# Patient Record
Sex: FEMALE | Race: WHITE | Employment: UNEMPLOYED | URBAN - METROPOLITAN AREA
[De-identification: names, ages, dates, MRNs, and addresses within clinical notes are randomized per-mention and may not be internally consistent; named-entity substitution may affect disease eponyms.]

---

## 2017-02-06 ENCOUNTER — HOSPITAL ENCOUNTER (OUTPATIENT)
Dept: GENERAL RADIOLOGY | Age: 52
Discharge: HOME | End: 2017-02-06
Payer: COMMERCIAL

## 2017-02-06 DIAGNOSIS — M47.812 CERVICAL SPONDYLOSIS: ICD-10-CM

## 2017-02-06 PROCEDURE — 72040 X-RAY EXAM NECK SPINE 2-3 VW: CPT

## 2017-02-21 ENCOUNTER — HOSPITAL ENCOUNTER (OUTPATIENT)
Dept: GENERAL RADIOLOGY | Age: 52
Discharge: HOME | End: 2017-02-21
Payer: COMMERCIAL

## 2017-02-21 DIAGNOSIS — M25.551 RIGHT HIP PAIN: ICD-10-CM

## 2017-02-21 DIAGNOSIS — M54.16 RIGHT LUMBAR RADICULOPATHY: ICD-10-CM

## 2017-02-21 PROCEDURE — 72100 X-RAY EXAM L-S SPINE 2/3 VWS: CPT

## 2017-02-21 PROCEDURE — 73502 X-RAY EXAM HIP UNI 2-3 VIEWS: CPT

## 2017-04-04 ENCOUNTER — HOSPITAL ENCOUNTER (OUTPATIENT)
Dept: LAB | Age: 52
Discharge: HOME | End: 2017-04-04

## 2017-04-04 PROCEDURE — 88305 TISSUE EXAM BY PATHOLOGIST: CPT | Performed by: INTERNAL MEDICINE

## 2017-04-05 ENCOUNTER — HOSPITAL ENCOUNTER (OUTPATIENT)
Dept: GENERAL RADIOLOGY | Age: 52
Discharge: HOME | End: 2017-04-05
Payer: COMMERCIAL

## 2017-04-05 DIAGNOSIS — M47.812 CERVICAL SPONDYLOSIS: ICD-10-CM

## 2017-04-05 PROCEDURE — 72040 X-RAY EXAM NECK SPINE 2-3 VW: CPT

## 2017-04-12 ENCOUNTER — APPOINTMENT (OUTPATIENT)
Dept: MAMMOGRAPHY | Age: 52
End: 2017-04-12
Attending: FAMILY MEDICINE
Payer: COMMERCIAL

## 2017-04-12 ENCOUNTER — HOSPITAL ENCOUNTER (OUTPATIENT)
Dept: MAMMOGRAPHY | Age: 52
Discharge: HOME | End: 2017-04-12
Attending: FAMILY MEDICINE
Payer: COMMERCIAL

## 2017-04-12 DIAGNOSIS — Z12.39 SCREENING FOR BREAST CANCER: ICD-10-CM

## 2017-04-12 PROCEDURE — 77067 SCR MAMMO BI INCL CAD: CPT

## 2018-05-21 PROBLEM — F32.1 MODERATE SINGLE CURRENT EPISODE OF MAJOR DEPRESSIVE DISORDER (HCC): Status: ACTIVE | Noted: 2018-05-21

## 2019-01-14 PROBLEM — E66.01 OBESITY, MORBID (HCC): Status: ACTIVE | Noted: 2019-01-14

## 2020-10-28 PROBLEM — M25.551 RIGHT HIP PAIN: Status: ACTIVE | Noted: 2020-10-28

## 2020-11-04 ENCOUNTER — HOSPITAL ENCOUNTER (OUTPATIENT)
Dept: MAMMOGRAPHY | Age: 55
Discharge: HOME | End: 2020-11-04
Attending: FAMILY MEDICINE
Payer: COMMERCIAL

## 2020-11-04 DIAGNOSIS — Z12.31 ENCOUNTER FOR SCREENING MAMMOGRAM FOR MALIGNANT NEOPLASM OF BREAST: ICD-10-CM

## 2020-11-04 PROCEDURE — 77067 SCR MAMMO BI INCL CAD: CPT

## 2022-03-19 PROBLEM — F32.1 MODERATE SINGLE CURRENT EPISODE OF MAJOR DEPRESSIVE DISORDER (HCC): Status: ACTIVE | Noted: 2018-05-21

## 2022-03-19 PROBLEM — M25.551 RIGHT HIP PAIN: Status: ACTIVE | Noted: 2020-10-28

## 2022-03-19 PROBLEM — E66.01 OBESITY, MORBID (HCC): Status: ACTIVE | Noted: 2019-01-14

## 2022-04-29 PROBLEM — M25.561 RIGHT KNEE PAIN: Status: ACTIVE | Noted: 2022-04-29

## 2022-05-13 PROBLEM — E11.9 CONTROLLED TYPE 2 DIABETES MELLITUS WITHOUT COMPLICATION, WITHOUT LONG-TERM CURRENT USE OF INSULIN (HCC): Status: ACTIVE | Noted: 2022-05-13

## 2022-05-25 ENCOUNTER — TELEPHONE (OUTPATIENT)
Dept: FAMILY MEDICINE CLINIC | Facility: CLINIC | Age: 57
End: 2022-05-25

## 2022-05-25 NOTE — TELEPHONE ENCOUNTER
Patient is requesting a call back from nurse   regarding paperwork for disability. Patient would like a call back as soon   as possible.

## 2022-05-25 NOTE — TELEPHONE ENCOUNTER
----- Message from Mahalia Kanner sent at 5/23/2022  5:09 PM EDT -----  Subject: Message to Provider    QUESTIONS  Information for Provider? Patient is requesting a call back from nurse   regarding paperwork for disability. Patient would like a call back as soon   as possible.  ---------------------------------------------------------------------------  --------------  CALL BACK INFO  What is the best way for the office to contact you? OK to leave message on   voicemail  Preferred Call Back Phone Number? [phone]  ---------------------------------------------------------------------------  --------------  SCRIPT ANSWERS  Relationship to Patient?  Self

## 2022-05-27 ENCOUNTER — HOSPITAL ENCOUNTER (OUTPATIENT)
Dept: MAMMOGRAPHY | Age: 57
Discharge: HOME | End: 2022-05-30

## 2022-05-27 ENCOUNTER — HOSPITAL ENCOUNTER (OUTPATIENT)
Dept: MAMMOGRAPHY | Age: 57
Discharge: HOME | End: 2022-05-30
Payer: COMMERCIAL

## 2022-05-27 DIAGNOSIS — Z12.31 ENCOUNTER FOR SCREENING MAMMOGRAM FOR BREAST CANCER: ICD-10-CM

## 2022-05-27 PROCEDURE — 77067 SCR MAMMO BI INCL CAD: CPT

## 2022-06-09 DIAGNOSIS — M25.542 JOINT PAIN IN FINGERS OF LEFT HAND: Primary | ICD-10-CM

## 2022-06-09 DIAGNOSIS — Z12.31 ENCOUNTER FOR SCREENING MAMMOGRAM FOR MALIGNANT NEOPLASM OF BREAST: Primary | ICD-10-CM

## 2022-06-17 ENCOUNTER — TELEPHONE (OUTPATIENT)
Dept: FAMILY MEDICINE CLINIC | Facility: CLINIC | Age: 57
End: 2022-06-17

## 2022-06-17 NOTE — TELEPHONE ENCOUNTER
Patient called,  New Fax # for [phone] [facility] ( they still have not received them)    Fax [phone]  In the space for insured ID [id number]   Needs to be on the page when faxed.

## 2022-07-12 ENCOUNTER — HOSPITAL ENCOUNTER (OUTPATIENT)
Dept: NUTRITION | Age: 57
Discharge: HOME | End: 2022-07-15

## 2022-07-12 PROCEDURE — 97802 MEDICAL NUTRITION INDIV IN: CPT

## 2022-07-12 NOTE — PROGRESS NOTES
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 Stage of Change: Action    Anthropometrics:   Estimated body mass index is 38.11 kg/m² as calculated from the following:    Height as of 5/13/22: 5' 9.5\" (1.765 m). Weight as of 5/13/22: 261 lb 12.8 oz (118.8 kg). Estimated Nutrition Needs:  MSJ x1.2= 2200 for weight reduction: 1800kcal/day     Protein: 90-100g/day (20%)     DIAGNOSIS:  Unbalanced diet related to nutrition knowledge deficit as evidenced by eating pattern as above. INTERVENTION:  Goal: Improved food variety  Goal: Achieve glycemic control    Goal for Diabetes MNT: Decrease complications from diabetes and CVD risk by intensive lifestyle modification specifically geared towards healthier food choices, and increased movement to support 5-10% weight reduction. Use MyPlate guide to compose structured meals (1/2 plate non starchy vegetables, 1/4 plate lean protein, 1/4 plate starchy vegetable or whole grain)  Consume 3-5 structured meals/snacks daily, using MyPlate to compose a balanced plate, and snack list provided. Track meals for 7 days. Check and record fasting, and 2hr PPBG during this time. Aim to reduce daily intake of added sugars to <24 grams for women/ 36 grams for men. Aim to reduce daily intake of sodium to < 2300mg/day (~600mg per meal, <140mg/ snack). Nutrition Education and Counseling (60 minutes):  Reviewed MNT Guidelines for DM  1. Discussed effects of foods/beverages on metabolic parameters (blood pressure, glucose, lipids, blood pressure and weight). Encouraged structured eating: planning ahead for macronutrient balanced meals and snacks, consistent CHO intake according to prescribed meal pattern, not skipping meals, and designated times for meals/snacks. 2. Encouraged reducing overall carbohydrate intake from baseline, choosing low glycemic, fiber rich foods and complex carbohydrate sources, lean protein sources, and heart healthy fats, and limiting treat foods to 2x a week or less. 3. Reviewed food sources of CHO, lean protein, and heart healthy fats. 4. Assessed and Individualized nutrient recommendations. Increase dietary fiber to 7-10 grams per meal, increase, increase water intake to 64 ounces daily, consume 2 servings of fruit daily        Materials Provided and Discussed:  Nutrition goals, BG targets, and signs/syptoms of hypoglycemia. Utilized the following behavior change strategies: MI, goal setting   Patient verbalized understanding of recommendations discussed. Goal: Glycemic improvements from baseline, increase fiber in diet, improve quality of current diet from baseline. Action Steps:           1. Track and record meals and 2hr PPBG          2. Try true lemon/lime flavoring packets          3. Consume 3 structured meals daily. - use my plate as a guide. MONITORING & EVALUATION:  Monitor Food and Nutrient Intake and Biochemical  Follow Up: PRN pt is a self pay, she agrees to schedule a follow up visit if PPBG is out of range.

## 2022-08-15 ENCOUNTER — OFFICE VISIT (OUTPATIENT)
Dept: FAMILY MEDICINE CLINIC | Facility: CLINIC | Age: 57
End: 2022-08-15
Payer: COMMERCIAL

## 2022-08-15 VITALS
DIASTOLIC BLOOD PRESSURE: 70 MMHG | OXYGEN SATURATION: 96 % | TEMPERATURE: 97.2 F | RESPIRATION RATE: 16 BRPM | BODY MASS INDEX: 34.93 KG/M2 | SYSTOLIC BLOOD PRESSURE: 126 MMHG | HEIGHT: 70 IN | HEART RATE: 72 BPM | WEIGHT: 244 LBS

## 2022-08-15 DIAGNOSIS — L30.9 DERMATITIS: ICD-10-CM

## 2022-08-15 DIAGNOSIS — E11.9 CONTROLLED TYPE 2 DIABETES MELLITUS WITHOUT COMPLICATION, WITHOUT LONG-TERM CURRENT USE OF INSULIN (HCC): Primary | ICD-10-CM

## 2022-08-15 LAB
ALBUMIN SERPL-MCNC: 4 G/DL (ref 3.5–5)
ALBUMIN/GLOB SERPL: 1 {RATIO} (ref 1.2–3.5)
ALP SERPL-CCNC: 90 U/L (ref 50–136)
ALT SERPL-CCNC: 41 U/L (ref 12–65)
ANION GAP SERPL CALC-SCNC: 7 MMOL/L (ref 7–16)
AST SERPL-CCNC: 29 U/L (ref 15–37)
BILIRUB SERPL-MCNC: 0.5 MG/DL (ref 0.2–1.1)
BUN SERPL-MCNC: 16 MG/DL (ref 6–23)
CALCIUM SERPL-MCNC: 9.8 MG/DL (ref 8.3–10.4)
CHLORIDE SERPL-SCNC: 107 MMOL/L (ref 98–107)
CHOLEST SERPL-MCNC: 237 MG/DL
CO2 SERPL-SCNC: 25 MMOL/L (ref 21–32)
CREAT SERPL-MCNC: 0.8 MG/DL (ref 0.6–1)
EST. AVERAGE GLUCOSE BLD GHB EST-MCNC: 140 MG/DL
GLOBULIN SER CALC-MCNC: 3.9 G/DL (ref 2.3–3.5)
GLUCOSE SERPL-MCNC: 105 MG/DL (ref 65–100)
HBA1C MFR BLD: 6.5 % (ref 4.8–5.6)
HDLC SERPL-MCNC: 41 MG/DL (ref 40–60)
HDLC SERPL: 5.8 {RATIO}
LDLC SERPL CALC-MCNC: 152.2 MG/DL
POTASSIUM SERPL-SCNC: 4.4 MMOL/L (ref 3.5–5.1)
PROT SERPL-MCNC: 7.9 G/DL (ref 6.3–8.2)
SODIUM SERPL-SCNC: 139 MMOL/L (ref 136–145)
TRIGL SERPL-MCNC: 219 MG/DL (ref 35–150)
VLDLC SERPL CALC-MCNC: 43.8 MG/DL (ref 6–23)

## 2022-08-15 PROCEDURE — 99214 OFFICE O/P EST MOD 30 MIN: CPT | Performed by: FAMILY MEDICINE

## 2022-08-15 PROCEDURE — 3046F HEMOGLOBIN A1C LEVEL >9.0%: CPT | Performed by: FAMILY MEDICINE

## 2022-08-15 RX ORDER — TRAZODONE HYDROCHLORIDE 50 MG/1
TABLET ORAL
COMMUNITY
Start: 2022-05-27

## 2022-08-15 RX ORDER — TRIAMCINOLONE ACETONIDE 0.25 MG/G
CREAM TOPICAL
Qty: 40 G | Refills: 5 | Status: SHIPPED | OUTPATIENT
Start: 2022-08-15

## 2022-08-15 NOTE — PROGRESS NOTES
[facility] [facility]  Phone: [phone] Fax [phone]  Vinh Barajas MD  8/15/2022         Ms. Bernabe Segovia  is a 62y.o.  year old  female patient who comes in to check on diabetes, hypertension, and high cholesterol. Checks blood sugars daily and they have been running 80s to 120s. She has lost weight and feels better. Last eye exam was last year. Feet have no problems. Did  have a flu shot this year. Did have a pneumonia shot . Did have a tetanus shot 2017. Has not been working on diet and exercise. Blood pressure has been under control. She has had pretty bad diarrhea from the metformin and is taking just a half a tablet a day. She is wondering if she could stop it since she has lost weight. She has developed an itchy rash between both eyebrows on her left eyelid and on either side of her nose. She has not used any new soaps shampoos or detergents. Some moisturizers seem to make it worse. Ms. Bernabe Segovia  has  has a past medical history of Arthritis, Cervical spondylosis with radiculopathy, Chondromalacia of left patellofemoral joint, Chondromalacia of patella, Chronic pain, MMT (medial meniscus tear), Obesity (BMI 01-58.5), Plica syndrome, Shoulder pain, right, Tear of lateral cartilage or meniscus of knee, current, Tear of lateral meniscus of left knee, current, Tear of medial meniscus of left knee, current, Varicose veins, and Venous insufficiency (chronic) (peripheral). Ms. Bernabe Segovia  has  has a past surgical history that includes implant breast silicone/eq (Bilateral, 1995); Cervical discectomy (1999); orthopedic surgery (Right); Knee arthroscopy (Right, 2015); Knee arthroscopy (Left, 2016); Tonsillectomy (as child); Hysterectomy (1998); Breast surgery; and heent.     Ms. Bernabe Segovia   Current Outpatient Medications   Medication Sig Dispense Refill    metFORMIN (GLUCOPHAGE) 500 MG tablet Take 500 mg by mouth daily (with breakfast) traZODone (DESYREL) 50 MG tablet TAKE 1 TABLET BY MOUTH NIGHTLY      triamcinolone (KENALOG) 0.025 % cream Apply topically 2 times daily. 40 g 5    acetaminophen (TYLENOL) 500 MG tablet Take by mouth every 6 hours as needed      FLUoxetine (PROZAC) 10 MG capsule Take 10 mg by mouth daily      gabapentin (NEURONTIN) 300 MG capsule 3 at bedtime      lidocaine-prilocaine (EMLA) 2.5-2.5 % cream Apply topically as needed       No current facility-administered medications for this visit. Ms. Stan Ortiz   Family History   Problem Relation Age of Onset    No Known Problems Brother     Diabetes Mother     Osteoarthritis Mother     No Known Problems Sister     Cancer Father         skin       Ms. Stan Ortiz    Social History     Socioeconomic History    Marital status:      Spouse name: Not on file    Number of children: Not on file    Years of education: Not on file    Highest education level: Not on file   Occupational History    Not on file   Tobacco Use    Smoking status: Former     Packs/day: 1.75     Types: Cigarettes     Quit date: 1998     Years since quittin.5    Smokeless tobacco: Never   Substance and Sexual Activity    Alcohol use: Yes     Alcohol/week: 0.0 standard drinks    Drug use: No    Sexual activity: Not on file   Other Topics Concern    Not on file   Social History Narrative    Not on file     Social Determinants of Health     Financial Resource Strain: Not on file   Food Insecurity: Not on file   Transportation Needs: Not on file   Physical Activity: Not on file   Stress: Not on file   Social Connections: Not on file   Intimate Partner Violence: Not on file   Housing Stability: Not on file       Ms. Stan Ortiz  has the following allergies:    Allergies   Allergen Reactions    Hydrocodone-Acetaminophen Itching and Rash       /70   Pulse 72   Temp 97.2 °F (36.2 °C)   Resp 16   Ht 5' 9.5\" (1.765 m)   Wt 244 lb (110.7 kg)   SpO2 96%   BMI 35.52 kg/m²     HEENT: Normocephalic, No atraumatic, pupils equal and reactive to light. Neck: Supple, no masses or thyromegaly. Lungs: clear to auscultation bilaterally. CV: regular rate and rhythm, without murmurs, rubs, or gallops. Skin: Red rash between her eyebrows and beside her nose and on her left eyelid. Ext: No lower extremity edema,    Diabetic foot exam:   Left Foot:   Visual Exam: normal   Pulse DP: 2+ (normal)   Filament test: normal sensation   Vibratory Sensation: normal  Right Foot:   Visual Exam: normal   Pulse DP: 2+ (normal)   Filament test: normal sensation   Vibratory Sensation: normal    No results found for this visit on 08/15/22. Chrissy Lloyd was seen today for diabetes. Diagnoses and all orders for this visit:    Controlled type 2 diabetes mellitus without complication, without long-term current use of insulin (HCC)  -     Hemoglobin A1C; Future  -     Comprehensive Metabolic Panel; Future  -     Lipid Panel; Future    Dermatitis  -     triamcinolone (KENALOG) 0.025 % cream; Apply topically 2 times daily.  -     [facility] - Manjit Blancas MD, PA      Patient counseled on diet and exercise. Commended her on her weight loss. She may indeed be able to get off metformin. Labs today. Follow-up 6 months. Refer to dermatology.   Leanne Sosa MD

## 2022-08-16 ENCOUNTER — TELEPHONE (OUTPATIENT)
Dept: NUTRITION | Age: 57
End: 2022-08-16

## 2022-08-16 ENCOUNTER — TELEPHONE (OUTPATIENT)
Dept: FAMILY MEDICINE CLINIC | Facility: CLINIC | Age: 57
End: 2022-08-16

## 2022-08-16 NOTE — TELEPHONE ENCOUNTER
Nutrition Counseling: Contacted pt regarding referral. See notes documented in Nutrition Counseling Referral for details. No further follow-up contact from pt. Will close referral for this office.     [facility]  [phone]

## 2022-08-16 NOTE — RESULT ENCOUNTER NOTE
Let patient know labs normal as far as A1c. She got it down to 6.5 which is excellent. She did get her cholesterol down to 237 but her LDL is still high at 152. Would like to recheck her cholesterol at her follow-up in 6 months and she may need a cholesterol medicine then.

## 2022-08-16 NOTE — TELEPHONE ENCOUNTER
----- Message from Vanessa Flores MD sent at 8/16/2022  6:54 AM EDT -----  Let patient know labs normal as far as A1c. She got it down to 6.5 which is excellent. She did get her cholesterol down to 237 but her LDL is still high at 152. Would like to recheck her cholesterol at her follow-up in 6 months and she may need a cholesterol medicine then.

## 2023-01-11 ENCOUNTER — OFFICE VISIT (OUTPATIENT)
Dept: FAMILY MEDICINE CLINIC | Facility: CLINIC | Age: 58
End: 2023-01-11
Payer: COMMERCIAL

## 2023-01-11 ENCOUNTER — NURSE ONLY (OUTPATIENT)
Dept: FAMILY MEDICINE CLINIC | Facility: CLINIC | Age: 58
End: 2023-01-11

## 2023-01-11 VITALS
OXYGEN SATURATION: 97 % | HEART RATE: 80 BPM | RESPIRATION RATE: 16 BRPM | WEIGHT: 242 LBS | DIASTOLIC BLOOD PRESSURE: 79 MMHG | HEIGHT: 69 IN | TEMPERATURE: 97.6 F | BODY MASS INDEX: 35.84 KG/M2 | SYSTOLIC BLOOD PRESSURE: 123 MMHG

## 2023-01-11 DIAGNOSIS — E11.9 CONTROLLED TYPE 2 DIABETES MELLITUS WITHOUT COMPLICATION, WITHOUT LONG-TERM CURRENT USE OF INSULIN (HCC): Primary | ICD-10-CM

## 2023-01-11 DIAGNOSIS — M25.551 RIGHT HIP PAIN: ICD-10-CM

## 2023-01-11 DIAGNOSIS — E11.9 CONTROLLED TYPE 2 DIABETES MELLITUS WITHOUT COMPLICATION, WITHOUT LONG-TERM CURRENT USE OF INSULIN (HCC): ICD-10-CM

## 2023-01-11 DIAGNOSIS — M79.601 RIGHT ARM PAIN: ICD-10-CM

## 2023-01-11 DIAGNOSIS — F32.1 MODERATE SINGLE CURRENT EPISODE OF MAJOR DEPRESSIVE DISORDER (HCC): ICD-10-CM

## 2023-01-11 DIAGNOSIS — G89.29 CHRONIC PAIN OF RIGHT KNEE: ICD-10-CM

## 2023-01-11 DIAGNOSIS — M25.561 CHRONIC PAIN OF RIGHT KNEE: ICD-10-CM

## 2023-01-11 DIAGNOSIS — H61.21 IMPACTED CERUMEN OF RIGHT EAR: ICD-10-CM

## 2023-01-11 DIAGNOSIS — M79.604 PAIN OF RIGHT LOWER EXTREMITY: ICD-10-CM

## 2023-01-11 PROCEDURE — 99214 OFFICE O/P EST MOD 30 MIN: CPT | Performed by: FAMILY MEDICINE

## 2023-01-11 RX ORDER — GABAPENTIN 300 MG/1
CAPSULE ORAL
Qty: 90 CAPSULE | Refills: 11 | Status: SHIPPED | OUTPATIENT
Start: 2023-01-11 | End: 2024-01-11

## 2023-01-11 RX ORDER — GABAPENTIN 300 MG/1
300 CAPSULE ORAL 3 TIMES DAILY
Qty: 270 CAPSULE | Refills: 3 | Status: CANCELLED | OUTPATIENT
Start: 2023-01-11

## 2023-01-11 RX ORDER — FLUOXETINE 10 MG/1
10 TABLET, FILM COATED ORAL DAILY
Qty: 30 TABLET | Refills: 11 | Status: SHIPPED | OUTPATIENT
Start: 2023-01-11

## 2023-01-11 RX ORDER — FLUOXETINE 10 MG/1
10 CAPSULE ORAL DAILY
Qty: 90 CAPSULE | Refills: 3 | Status: CANCELLED | OUTPATIENT
Start: 2023-01-11

## 2023-01-11 ASSESSMENT — PATIENT HEALTH QUESTIONNAIRE - PHQ9
8. MOVING OR SPEAKING SO SLOWLY THAT OTHER PEOPLE COULD HAVE NOTICED. OR THE OPPOSITE, BEING SO FIGETY OR RESTLESS THAT YOU HAVE BEEN MOVING AROUND A LOT MORE THAN USUAL: 3
SUM OF ALL RESPONSES TO PHQ QUESTIONS 1-9: 6
4. FEELING TIRED OR HAVING LITTLE ENERGY: 1
1. LITTLE INTEREST OR PLEASURE IN DOING THINGS: 0
SUM OF ALL RESPONSES TO PHQ QUESTIONS 1-9: 6
5. POOR APPETITE OR OVEREATING: 1
SUM OF ALL RESPONSES TO PHQ9 QUESTIONS 1 & 2: 0
SUM OF ALL RESPONSES TO PHQ QUESTIONS 1-9: 6
SUM OF ALL RESPONSES TO PHQ QUESTIONS 1-9: 6
10. IF YOU CHECKED OFF ANY PROBLEMS, HOW DIFFICULT HAVE THESE PROBLEMS MADE IT FOR YOU TO DO YOUR WORK, TAKE CARE OF THINGS AT HOME, OR GET ALONG WITH OTHER PEOPLE: 3
6. FEELING BAD ABOUT YOURSELF - OR THAT YOU ARE A FAILURE OR HAVE LET YOURSELF OR YOUR FAMILY DOWN: 0
7. TROUBLE CONCENTRATING ON THINGS, SUCH AS READING THE NEWSPAPER OR WATCHING TELEVISION: 0
2. FEELING DOWN, DEPRESSED OR HOPELESS: 0
3. TROUBLE FALLING OR STAYING ASLEEP: 1
9. THOUGHTS THAT YOU WOULD BE BETTER OFF DEAD, OR OF HURTING YOURSELF: 0

## 2023-01-11 NOTE — PROGRESS NOTES
[address] [facility] J[address] Kenny  Phone: [phone] Fax [phone]  Yuval Berry MD  1/11/2023         Ms. Dwight Garcia  is a 62y.o.  year old  female patient who comes in to check on diabetes, hypertension, and high cholesterol. Checks blood sugars daily and they have been running 80s to 120s. She has lost weight and feels better. Last eye exam was last year. Feet have no problems. Did  have a flu shot this year. Did have a pneumonia shot . Did have a tetanus shot 2017. Has not been working on diet and exercise. Blood pressure has been under control. She has had pretty bad diarrhea from the metformin and is taking just a half a tablet a day. She is wondering if she could stop it since she has lost weight. She has had some left ear pain that is better, but she just wanted it checked. She has noticed some left jaw pain as well and it goes down into the left side of her neck but her throat does not really sore. She does take gabapentin at night for pain in her legs and knees. She does have some decrease in her feet and has had a neuropathy. She is doing well with the Prozac 10 mg for depression. She is wondering if she can stop it now. She has been on it about 4 years. No thoughts of suicide.     PHQ-9  1/11/2023   Little interest or pleasure in doing things 0   Little interest or pleasure in doing things -   Feeling down, depressed, or hopeless 0   Trouble falling or staying asleep, or sleeping too much 1   Trouble falling or staying asleep, or sleeping too much -   Feeling tired or having little energy 1   Feeling tired or having little energy -   Poor appetite or overeating 1   Poor appetite, weight loss, or overeating -   Feeling bad about yourself - or that you are a failure or have let yourself or your family down 0   Feeling bad about yourself - or that you are a failure or have let yourself or your family down -   Trouble concentrating on things, such as reading the newspaper or watching television 0   Trouble concentrating on things such as school, work, reading, or watching TV -   Moving or speaking so slowly that other people could have noticed. Or the opposite - being so fidgety or restless that you have been moving around a lot more than usual 3   Moving or speaking so slowly that other people could have noticed; or the opposite being so fidgety that others notice -   Thoughts that you would be better off dead, or of hurting yourself in some way 0   Thoughts of being better off dead, or hurting yourself in some way -   PHQ-2 Score 0   Total Score PHQ 2 -   PHQ-9 Total Score 6   PHQ 9 Score -   If you checked off any problems, how difficult have these problems made it for you to do your work, take care of things at home, or get along with other people? 3   How difficult have these problems made it for you to do your work, take care of your home and get along with others -        Ms. Aguila Perez  has  has a past medical history of Arthritis, Cervical spondylosis with radiculopathy, Chondromalacia of left patellofemoral joint, Chondromalacia of patella, Chronic pain, MMT (medial meniscus tear), Obesity (BMI 38-16.6), Plica syndrome, Shoulder pain, right, Tear of lateral cartilage or meniscus of knee, current, Tear of lateral meniscus of left knee, current, Tear of medial meniscus of left knee, current, Varicose veins, and Venous insufficiency (chronic) (peripheral). Ms. Aguila Perez  has  has a past surgical history that includes implant breast silicone/eq (Bilateral, 1995); Cervical discectomy (1999); orthopedic surgery (Right); Knee arthroscopy (Right, 2015); Knee arthroscopy (Left, 2016); Tonsillectomy (as child); Hysterectomy (1998); Breast surgery; and heent.     Ms. Aguila Perez   Current Outpatient Medications   Medication Sig Dispense Refill    gabapentin (NEURONTIN) 300 MG capsule 3 at bedtime 90 capsule 11    FLUoxetine (PROZAC) 10 MG tablet Take 1 tablet by mouth daily 30 tablet 11    acetaminophen (TYLENOL) 500 MG tablet Take by mouth every 6 hours as needed      FLUoxetine (PROZAC) 10 MG capsule Take 10 mg by mouth daily      lidocaine-prilocaine (EMLA) 2.5-2.5 % cream Apply topically as needed       No current facility-administered medications for this visit. Ms. Anitha Lazaro   Family History   Problem Relation Age of Onset    No Known Problems Brother     Diabetes Mother     Osteoarthritis Mother     No Known Problems Sister     Cancer Father         skin       Ms. Anitha Lazaro    Social History     Socioeconomic History    Marital status:      Spouse name: Not on file    Number of children: Not on file    Years of education: Not on file    Highest education level: Not on file   Occupational History    Not on file   Tobacco Use    Smoking status: Former     Packs/day: 1.75     Types: Cigarettes     Quit date: 1998     Years since quittin.9    Smokeless tobacco: Never   Substance and Sexual Activity    Alcohol use: Yes     Alcohol/week: 0.0 standard drinks    Drug use: No    Sexual activity: Not on file   Other Topics Concern    Not on file   Social History Narrative    Not on file     Social Determinants of Health     Financial Resource Strain: Not on file   Food Insecurity: Not on file   Transportation Needs: Not on file   Physical Activity: Not on file   Stress: Not on file   Social Connections: Not on file   Intimate Partner Violence: Not on file   Housing Stability: Not on file       Ms. Anitha Lazaro  has the following allergies: Allergies   Allergen Reactions    Hydrocodone-Acetaminophen Itching and Rash       /79   Pulse 80   Temp 97.6 °F (36.4 °C)   Resp 16   Ht 5' 9.2\" (1.758 m)   Wt 242 lb (109.8 kg)   SpO2 97%   BMI 35.53 kg/m²     HEENT: Normocephalic, atraumatic, pupils equal and reactive to light. Tympanic membrane on the right is obscured by wax and could not be washed out. Left Tms intact without erythema or edema. Oropharynx clear. She does have some pain over her left jaw joint with opening and closing her mouth. Neck: Supple, no masses or thyromegaly. Lungs: clear to auscultation bilaterally. CV: regular rate and rhythm, without murmurs, rubs, or gallops. Ext: No lower extremity edema,    Diabetic foot exam:   Left Foot:   Visual Exam: normal   Pulse DP: 2+ (normal)   Filament test: reduced sensation   Vibratory Sensation: normal  Right Foot:   Visual Exam: normal   Pulse DP: 2+ (normal)   Filament test: reduced sensation   Vibratory Sensation: normal    No results found for this visit on 01/11/23. Coty Quick was seen today for diabetes, otalgia and pharyngitis. Diagnoses and all orders for this visit:    Controlled type 2 diabetes mellitus without complication, without long-term current use of insulin (HCC)  -     Hemoglobin A1C; Future    Moderate single current episode of major depressive disorder (HCC)  -     FLUoxetine (PROZAC) 10 MG tablet; Take 1 tablet by mouth daily    Right hip pain  -     gabapentin (NEURONTIN) 300 MG capsule; 3 at bedtime    Right arm pain  -     gabapentin (NEURONTIN) 300 MG capsule; 3 at bedtime    Pain of right lower extremity  -     gabapentin (NEURONTIN) 300 MG capsule; 3 at bedtime    Chronic pain of right knee  -     gabapentin (NEURONTIN) 300 MG capsule; 3 at bedtime    Impacted cerumen of right ear        She can taper off the Prozac by taking 1 every other day for a week and then stop. I did give her a prescription for it if she feels like she needs to go back on it. Check an A1c today. Follow-up 6 months. I did not see evidence of any infection needs treating but she will use Tylenol arthritis for jaw pain. Patient counseled on diet and exercise.     Selina Porter MD

## 2023-01-12 LAB
EST. AVERAGE GLUCOSE BLD GHB EST-MCNC: 131 MG/DL
HBA1C MFR BLD: 6.2 % (ref 4.8–5.6)

## 2023-04-11 ENCOUNTER — PATIENT MESSAGE (OUTPATIENT)
Dept: FAMILY MEDICINE CLINIC | Facility: CLINIC | Age: 58
End: 2023-04-11

## 2023-05-16 ENCOUNTER — TELEPHONE (OUTPATIENT)
Dept: FAMILY MEDICINE CLINIC | Facility: CLINIC | Age: 58
End: 2023-05-16

## 2023-05-16 NOTE — TELEPHONE ENCOUNTER
Pt called last week for copy of a form that   indicated her condition and limitations. Was directed to Medical Records. When she spoke with them, they informed her that she would have to get a   copy of that document from her PCP office. The document includes her   diagnosis and limitations as well as DME needed. Please call pt to advise   how she can obtain this information. Time period includes 2020 - 2023. This is needed for her  to represent her disability claim.

## 2023-05-16 NOTE — TELEPHONE ENCOUNTER
Pt called last week for copy of a form that   indicated her condition and limitations. Was directed to Medical Records. When she spoke with them, they informed her that she would have to get a   copy of that document from her PCP office. The document includes her   diagnosis and limitations as well as DME needed. Please call pt to advise   how she can obtain this information. Time period includes 2020 - 2023. This is needed for her  to represent her disability claim. Will call pt to come fill out MAITE. Pt will come by to sign form. Left at  for pt to sign.

## 2023-05-23 RX ORDER — LIDOCAINE AND PRILOCAINE 25; 25 MG/G; MG/G
CREAM TOPICAL
Qty: 30 G | Refills: 0 | OUTPATIENT
Start: 2023-05-23

## 2023-08-02 ENCOUNTER — OFFICE VISIT (OUTPATIENT)
Dept: FAMILY MEDICINE CLINIC | Facility: CLINIC | Age: 58
End: 2023-08-02
Payer: COMMERCIAL

## 2023-08-02 VITALS
SYSTOLIC BLOOD PRESSURE: 146 MMHG | TEMPERATURE: 98 F | DIASTOLIC BLOOD PRESSURE: 79 MMHG | BODY MASS INDEX: 37.95 KG/M2 | RESPIRATION RATE: 18 BRPM | WEIGHT: 256.2 LBS | OXYGEN SATURATION: 98 % | HEIGHT: 69 IN | HEART RATE: 66 BPM

## 2023-08-02 DIAGNOSIS — R03.0 ELEVATED BLOOD PRESSURE READING WITHOUT DIAGNOSIS OF HYPERTENSION: ICD-10-CM

## 2023-08-02 DIAGNOSIS — M54.50 RIGHT-SIDED LOW BACK PAIN WITHOUT SCIATICA, UNSPECIFIED CHRONICITY: Primary | ICD-10-CM

## 2023-08-02 PROCEDURE — 99213 OFFICE O/P EST LOW 20 MIN: CPT | Performed by: PHYSICIAN ASSISTANT

## 2023-08-02 RX ORDER — NABUMETONE 500 MG/1
500 TABLET, FILM COATED ORAL 2 TIMES DAILY
Qty: 60 TABLET | Refills: 0 | Status: SHIPPED | OUTPATIENT
Start: 2023-08-02

## 2023-08-02 ASSESSMENT — ENCOUNTER SYMPTOMS
BACK PAIN: 1
GASTROINTESTINAL NEGATIVE: 1
SHORTNESS OF BREATH: 0

## 2023-08-02 NOTE — PROGRESS NOTES
[facility], [address] [place] [address]  Phone [phone]      Patient: Shayna Penaloza  YOB: 1965  Age 62 y.o. Sex female  Medical Record:  [id number]  Visit Date: 08/02/23  Author:  Robson Min PA-C    Family Practice Clinic Note    Chief Complaint   Patient presents with    Back Pain     Middle of back, goes into right hip- hurt back in shower on 8/1       History of Present Illness  This is a 51-year-old female who presents today with complaints of acute onset of right-sided low back pain. She has had a history of neck and back pain occurring on and off in the past.  She has had 2 surgeries on the C-spine but denies surgical intervention at the T or L-spine. She states that yesterday while in the shower, sitting in her shower chair, she bent over to pick something up off of the shower floor and felt a pull on the right side of her back. She has had pain from the upper aspect of the lower back radiating to the right buttock. Denies any radiation down the leg however. Denies numbness, tingling or weakness of the extremity. She states that almost any movement exacerbates the pain. She has been using Tylenol and takes gabapentin at bedtime for her previous issues. These have not provided much relief for her current pain. She denies difficulty controlling bowel or bladder. Denies increased urinary frequency, dysuria or gross hematuria. Past History:    Past Medical history   Past Medical History:   Diagnosis Date    Arthritis     OA- generalized     Cervical spondylosis with radiculopathy 12/13/2016    Neck surgery - Dr. Venkata Mack.     Chondromalacia of left patellofemoral joint 3/11/2016    Chondromalacia of patella 6/18/2015    Chronic pain     neck and R arm since 2013    MMT (medial meniscus tear)     right    Obesity (BMI 30-39.9) 04/70/6666    BMI 38    Plica syndrome 7/64/0748    Shoulder pain, right     and right hip pain    Tear of lateral cartilage

## 2023-08-02 NOTE — PATIENT INSTRUCTIONS
*Try the antiinflammatory (Relafen) - take twice a day - take with food. *Recommend alternating ice (for 10 min) and heat (for 30 min), 3-4 times a day as able/needed.

## 2023-09-12 ENCOUNTER — OFFICE VISIT (OUTPATIENT)
Dept: FAMILY MEDICINE CLINIC | Facility: CLINIC | Age: 58
End: 2023-09-12
Payer: COMMERCIAL

## 2023-09-12 VITALS
WEIGHT: 260 LBS | RESPIRATION RATE: 16 BRPM | SYSTOLIC BLOOD PRESSURE: 129 MMHG | TEMPERATURE: 97.3 F | BODY MASS INDEX: 38.51 KG/M2 | OXYGEN SATURATION: 97 % | HEIGHT: 69 IN | HEART RATE: 76 BPM | DIASTOLIC BLOOD PRESSURE: 87 MMHG

## 2023-09-12 DIAGNOSIS — G89.29 CHRONIC PAIN OF RIGHT KNEE: ICD-10-CM

## 2023-09-12 DIAGNOSIS — F41.9 ANXIETY: Primary | ICD-10-CM

## 2023-09-12 DIAGNOSIS — M79.601 RIGHT ARM PAIN: ICD-10-CM

## 2023-09-12 DIAGNOSIS — M25.551 RIGHT HIP PAIN: ICD-10-CM

## 2023-09-12 DIAGNOSIS — M79.641 RIGHT HAND PAIN: ICD-10-CM

## 2023-09-12 DIAGNOSIS — M25.561 CHRONIC PAIN OF RIGHT KNEE: ICD-10-CM

## 2023-09-12 DIAGNOSIS — F32.1 MODERATE SINGLE CURRENT EPISODE OF MAJOR DEPRESSIVE DISORDER (HCC): ICD-10-CM

## 2023-09-12 DIAGNOSIS — M54.2 NECK PAIN: ICD-10-CM

## 2023-09-12 DIAGNOSIS — M47.22 CERVICAL SPONDYLOSIS WITH RADICULOPATHY: ICD-10-CM

## 2023-09-12 DIAGNOSIS — M79.604 PAIN OF RIGHT LOWER EXTREMITY: ICD-10-CM

## 2023-09-12 PROCEDURE — 99214 OFFICE O/P EST MOD 30 MIN: CPT | Performed by: FAMILY MEDICINE

## 2023-09-12 RX ORDER — FLUOXETINE 10 MG/1
10 TABLET, FILM COATED ORAL DAILY
Qty: 30 TABLET | Refills: 11 | Status: SHIPPED | OUTPATIENT
Start: 2023-09-12

## 2023-09-12 RX ORDER — GABAPENTIN 300 MG/1
CAPSULE ORAL
Qty: 90 CAPSULE | Refills: 11 | Status: SHIPPED | OUTPATIENT
Start: 2023-09-12 | End: 2024-09-11

## 2023-09-12 SDOH — ECONOMIC STABILITY: FOOD INSECURITY: WITHIN THE PAST 12 MONTHS, THE FOOD YOU BOUGHT JUST DIDN'T LAST AND YOU DIDN'T HAVE MONEY TO GET MORE.: NEVER TRUE

## 2023-09-12 SDOH — ECONOMIC STABILITY: HOUSING INSECURITY
IN THE LAST 12 MONTHS, WAS THERE A TIME WHEN YOU DID NOT HAVE A STEADY PLACE TO SLEEP OR SLEPT IN A SHELTER (INCLUDING NOW)?: NO

## 2023-09-12 SDOH — ECONOMIC STABILITY: INCOME INSECURITY: HOW HARD IS IT FOR YOU TO PAY FOR THE VERY BASICS LIKE FOOD, HOUSING, MEDICAL CARE, AND HEATING?: NOT HARD AT ALL

## 2023-09-12 SDOH — ECONOMIC STABILITY: FOOD INSECURITY: WITHIN THE PAST 12 MONTHS, YOU WORRIED THAT YOUR FOOD WOULD RUN OUT BEFORE YOU GOT MONEY TO BUY MORE.: NEVER TRUE

## 2023-09-12 ASSESSMENT — PATIENT HEALTH QUESTIONNAIRE - PHQ9
SUM OF ALL RESPONSES TO PHQ QUESTIONS 1-9: 12
SUM OF ALL RESPONSES TO PHQ QUESTIONS 1-9: 12
7. TROUBLE CONCENTRATING ON THINGS, SUCH AS READING THE NEWSPAPER OR WATCHING TELEVISION: 1
3. TROUBLE FALLING OR STAYING ASLEEP: 2
2. FEELING DOWN, DEPRESSED OR HOPELESS: 1
10. IF YOU CHECKED OFF ANY PROBLEMS, HOW DIFFICULT HAVE THESE PROBLEMS MADE IT FOR YOU TO DO YOUR WORK, TAKE CARE OF THINGS AT HOME, OR GET ALONG WITH OTHER PEOPLE: 2
SUM OF ALL RESPONSES TO PHQ QUESTIONS 1-9: 12
SUM OF ALL RESPONSES TO PHQ QUESTIONS 1-9: 12
9. THOUGHTS THAT YOU WOULD BE BETTER OFF DEAD, OR OF HURTING YOURSELF: 0
6. FEELING BAD ABOUT YOURSELF - OR THAT YOU ARE A FAILURE OR HAVE LET YOURSELF OR YOUR FAMILY DOWN: 0
1. LITTLE INTEREST OR PLEASURE IN DOING THINGS: 2
4. FEELING TIRED OR HAVING LITTLE ENERGY: 2
8. MOVING OR SPEAKING SO SLOWLY THAT OTHER PEOPLE COULD HAVE NOTICED. OR THE OPPOSITE, BEING SO FIGETY OR RESTLESS THAT YOU HAVE BEEN MOVING AROUND A LOT MORE THAN USUAL: 2
5. POOR APPETITE OR OVEREATING: 2
SUM OF ALL RESPONSES TO PHQ9 QUESTIONS 1 & 2: 3

## 2023-09-12 ASSESSMENT — ANXIETY QUESTIONNAIRES
IF YOU CHECKED OFF ANY PROBLEMS ON THIS QUESTIONNAIRE, HOW DIFFICULT HAVE THESE PROBLEMS MADE IT FOR YOU TO DO YOUR WORK, TAKE CARE OF THINGS AT HOME, OR GET ALONG WITH OTHER PEOPLE: VERY DIFFICULT
2. NOT BEING ABLE TO STOP OR CONTROL WORRYING: 1
GAD7 TOTAL SCORE: 11
4. TROUBLE RELAXING: 1
7. FEELING AFRAID AS IF SOMETHING AWFUL MIGHT HAPPEN: 3
3. WORRYING TOO MUCH ABOUT DIFFERENT THINGS: 1
6. BECOMING EASILY ANNOYED OR IRRITABLE: 3
5. BEING SO RESTLESS THAT IT IS HARD TO SIT STILL: 0
1. FEELING NERVOUS, ANXIOUS, OR ON EDGE: 2

## 2023-09-12 NOTE — PROGRESS NOTES
[address], [address] [place] [address]  Phone: [phone] Fax [phone]  Mcdonald Buerger, MD  9/12/2023           Ms. Lewis Butler  is a 62y.o.  year old  female patient who comes in for several issues. 1 is that she needs to get back on Prozac. She was taking it for depression in the past but did better and was able to get off of it. She now finds that she is irritable and edgy and it seems to be more anxiety than depression. She is not having thoughts of suicide. 9/12/2023    11:49 AM   PHQ-9    Little interest or pleasure in doing things 2   Feeling down, depressed, or hopeless 1   Trouble falling or staying asleep, or sleeping too much 2   Feeling tired or having little energy 2   Poor appetite or overeating 2   Feeling bad about yourself - or that you are a failure or have let yourself or your family down 0   Trouble concentrating on things, such as reading the newspaper or watching television 1   Moving or speaking so slowly that other people could have noticed.  Or the opposite - being so fidgety or restless that you have been moving around a lot more than usual 2   Thoughts that you would be better off dead, or of hurting yourself in some way 0   PHQ-2 Score 3   PHQ-9 Total Score 12   If you checked off any problems, how difficult have these problems made it for you to do your work, take care of things at home, or get along with other people? 2          9/12/2023    11:00 AM   CALLUM-7 SCREENING   Feeling nervous, anxious, or on edge More than half the days   Not being able to stop or control worrying Several days   Worrying too much about different things Several days   Trouble relaxing Several days   Being so restless that it is hard to sit still Not at all   Becoming easily annoyed or irritable Nearly every day   Feeling afraid as if something awful might happen Nearly every day   CALLUM-7 Total Score 11   How difficult have these problems made it for you to do

## 2023-09-25 ENCOUNTER — TELEPHONE (OUTPATIENT)
Dept: FAMILY MEDICINE CLINIC | Facility: CLINIC | Age: 58
End: 2023-09-25

## 2023-09-26 ENCOUNTER — OFFICE VISIT (OUTPATIENT)
Dept: ORTHOPEDIC SURGERY | Age: 58
End: 2023-09-26
Payer: COMMERCIAL

## 2023-09-26 DIAGNOSIS — R22.31 MASS OF RIGHT FINGER: ICD-10-CM

## 2023-09-26 DIAGNOSIS — M79.644 FINGER PAIN, RIGHT: Primary | ICD-10-CM

## 2023-09-26 PROCEDURE — 99204 OFFICE O/P NEW MOD 45 MIN: CPT | Performed by: NURSE PRACTITIONER

## 2023-09-26 NOTE — PROGRESS NOTES
Orthopaedic Hand Clinic Note    Name: Brandon Acevedo  YOB: 1965  Gender: female  MRN: [id number]      CC: Patient referred for evaluation of right middle finger     HPI: Brandon Acevedo is a 62 y.o. female right hand dominant with a chief complaint of right middle finger. She was last seen by Dr. Manjit Liang in 2017. He wanted to obtain an MRI with contrast to evaluate a mass. She said due to insurance reasons she was unable to have that done. She reports approximately 6 to 7 years ago she woke up with a sharp burning pain on the palmar aspect at the base of the right middle finger. She denies injury. She said it appeared there was a fluid collection. She said since then it has worsened. Her finger is in flexion and she is not able to extend it. She said she has trouble with . She says the pain is getting worse. ROS/Meds/PSH/PMH/FH/SH: I personally reviewed the patients standard intake form. Pertinents are discussed in the HPI    Physical Examination:  General: Awake and alert. HEENT: Normocephalic, atraumatic  CV/Pulm: Breathing even and unlabored  Skin: No obvious rashes noted. Lymphatic: No obvious evidence of lymphedema or lymphadenopathy    Musculoskeletal Exam:  Examination on the right upper extremity demonstrates cap refill < 5 seconds in all fingers  Patient has swelling to the right middle finger. Her PIP is held in flexion. She is not able to extend her joint. She is not able to make a full composite fist with the right middle finger. She denies paresthesia. There appears to be a palpable mass along the volar aspect between the MCP and PIP joint. She denies paresthesia. Her capillary refills within normal limits. Imaging / Electrodiagnostic Tests:     X-ray includes a three-view right hand are reviewed. Patient appears to have arthritis at the PIP joint. This is difficult to fully assess given the contracture at the PIP joint. Ultrasound was performed.   No

## 2023-09-27 ENCOUNTER — TELEPHONE (OUTPATIENT)
Dept: ORTHOPEDIC SURGERY | Age: 58
End: 2023-09-27

## 2023-09-27 DIAGNOSIS — M79.644 FINGER PAIN, RIGHT: ICD-10-CM

## 2023-09-27 DIAGNOSIS — R22.31 MASS OF RIGHT FINGER: Primary | ICD-10-CM

## 2023-10-17 ENCOUNTER — HOSPITAL ENCOUNTER (OUTPATIENT)
Dept: MRI IMAGING | Age: 58
Discharge: HOME | End: 2023-10-20

## 2023-10-17 DIAGNOSIS — R22.31 MASS OF RIGHT FINGER: ICD-10-CM

## 2023-10-17 DIAGNOSIS — M79.644 FINGER PAIN, RIGHT: ICD-10-CM

## 2023-10-17 RX ORDER — SODIUM CHLORIDE 0.9 % (FLUSH) 0.9 %
10 SYRINGE (ML) INJECTION AS NEEDED
Status: DISCONTINUED | OUTPATIENT
Start: 2023-10-17 | End: 2023-10-21 | Stop reason: HOSPADM

## 2023-10-30 ENCOUNTER — HOSPITAL ENCOUNTER (OUTPATIENT)
Dept: MRI IMAGING | Age: 58
Discharge: HOME | End: 2023-11-02

## 2023-11-02 ENCOUNTER — OFFICE VISIT (OUTPATIENT)
Dept: ORTHOPEDIC SURGERY | Age: 58
End: 2023-11-02
Payer: COMMERCIAL

## 2023-11-02 DIAGNOSIS — M79.644 FINGER PAIN, RIGHT: Primary | ICD-10-CM

## 2023-11-02 DIAGNOSIS — M72.0 DUPUYTREN'S CONTRACTURE OF RIGHT HAND: ICD-10-CM

## 2023-11-02 PROCEDURE — 99214 OFFICE O/P EST MOD 30 MIN: CPT | Performed by: NURSE PRACTITIONER

## 2023-11-02 NOTE — H&P (VIEW-ONLY)
Orthopaedic Hand Clinic Note    Name: Tae Perez  YOB: 1965  Gender: female  MRN: [id number]      Follow up visit:   1. Finger pain, right    2. Dupuytren's contracture of right hand        HPI: Tae Perez is a 62 y.o. female who is following up for right hand pain and right middle finger contracture. She is here to discuss her MRI results. She was last seen by Dr. Emily Herrera in 2017. He wanted to obtain an MRI with contrast to evaluate a mass. She said due to insurance reasons she was unable to have that done. She reports approximately 6 to 7 years ago she woke up with a sharp burning pain on the palmar aspect at the base of the right middle finger. She denies injury. She said it appeared there was a fluid collection. She said since then it has worsened. Her finger is in flexion and she is not able to extend it. She said she has trouble with . She says the pain is getting worse. ROS/Meds/PSH/PMH/FH/SH: I personally reviewed the patients standard intake form. Pertinents are discussed in the HPI    Physical Examination:    Musculoskeletal Examination:  Examination on the right upper extremity demonstrates cap refill < 5 seconds in all fingers  Patient has swelling to the right middle finger. Her PIP is held in flexion. She is not able to extend her joint. She is not able to make a full composite fist with the right middle finger. She denies paresthesia. There appears to be a palpable mass along the volar aspect between the MCP and PIP joint. She denies paresthesia. Her capillary refills within normal limits. Imaging / Electrodiagnostic Tests:     MRI of the right hand is reviewed with Dr. Jose Manuel Guillaume. Patient has a soft tissue lesion extending from the dermis to the flexor tendon sheath of the third digit, likely Dupuytren's disease with resulting flexion deformity of the PIP joint on the middle finger.   MRI also shows probable additional developing lesion along

## 2023-11-02 NOTE — PROGRESS NOTES
the medial aspect of the distal second digit. Assessment:     ICD-10-CM    1. Finger pain, right  M79.644       2. Dupuytren's contracture of right hand  M72.0           Plan:   We discussed the diagnosis and different treatment options. We discussed observation, therapy, antiinflammatory medications and other pertinent treatment modalities. After discussing in detail the patient elects to proceed with surgical intervention. Risk and benefits were addressed with the patient in detail. She understands and wishes to proceed. We will get surgery set up at her convenience. .     Patient voiced accordance and understanding of the information provided and the formulated plan. All questions were answered to the patient's satisfaction during the encounter.     4 This is a chronic illness with exacerbation, progression, or side effect of treatment  Treatment at this time:  Surgery    VICENTA Olson - CNP  Orthopaedic Surgery  11/02/23  11:50 AM

## 2023-11-09 DIAGNOSIS — M72.0 DUPUYTREN'S CONTRACTURE OF HAND: ICD-10-CM

## 2023-11-09 DIAGNOSIS — M72.0 DUPUYTREN'S CONTRACTURE OF RIGHT HAND: Primary | ICD-10-CM

## 2023-11-17 NOTE — PERIOP NOTE
Patient verified name and . Order for consent NOT found in EHR; patient verifies procedure. Type 1B surgery, phone assessment complete. Orders NOT received. Labs per surgeon: No orders received. Labs per anesthesia protocol: None. Patient answered medical/surgical history questions at their best of ability. All prior to admission medications documented in EPIC. Patient instructed to take the following medications the day of surgery according to anesthesia guidelines with a small sip of water: Prozac. Hold all vitamins, supplements, herbals 7 days prior to surgery and NSAIDS 5 days prior to surgery. Patient instructed on the following:    > Arrive at [facility], time of arrival to be called the day before by 1700  > NPO after midnight, unless otherwise indicated, including gum, mints, and ice chips  > Responsible adult must drive patient to the hospital, stay during surgery, and patient will need supervision 24 hours after anesthesia  > Use non moisturizing or antibacterial soap soap in shower the night before surgery and on the morning of surgery  > All piercings must be removed prior to arrival.    > Leave all valuables (money and jewelry) at home but bring insurance card and ID on DOS.   > Do not wear make-up, nail polish, lotions, cologne, perfumes, powders, or oil on skin. Artificial nails are not permitted.

## 2023-11-22 NOTE — PERIOP NOTE
Preop department called to notify patient of arrival time for scheduled procedure. Instructions given to   - Arrive at [facility]. - Remain NPO after midnight, unless otherwise indicated, including gum, mints, and ice chips. - Have a responsible adult to drive patient to the hospital, stay during surgery, and patient will need supervision 24 hours after anesthesia. - Use antibacterial soap in shower the night before surgery and on the morning of surgery.        Was patient contacted: pt  Voicemail left:   Numbers contacted: [phone] time: [id number]

## 2023-11-25 ENCOUNTER — ANESTHESIA EVENT (OUTPATIENT)
Dept: SURGERY | Age: 58
End: 2023-11-25
Payer: COMMERCIAL

## 2023-11-26 DIAGNOSIS — M72.0 DUPUYTREN'S CONTRACTURE OF RIGHT HAND: Primary | ICD-10-CM

## 2023-11-27 ENCOUNTER — HOSPITAL ENCOUNTER (OUTPATIENT)
Age: 58
Setting detail: OUTPATIENT SURGERY
Discharge: HOME | End: 2023-11-27
Attending: ORTHOPAEDIC SURGERY | Admitting: ORTHOPAEDIC SURGERY
Payer: COMMERCIAL

## 2023-11-27 ENCOUNTER — ANESTHESIA (OUTPATIENT)
Dept: SURGERY | Age: 58
End: 2023-11-27
Payer: COMMERCIAL

## 2023-11-27 VITALS
HEART RATE: 59 BPM | WEIGHT: 260 LBS | DIASTOLIC BLOOD PRESSURE: 55 MMHG | RESPIRATION RATE: 17 BRPM | BODY MASS INDEX: 36.4 KG/M2 | HEIGHT: 71 IN | TEMPERATURE: 97.4 F | OXYGEN SATURATION: 94 % | SYSTOLIC BLOOD PRESSURE: 100 MMHG

## 2023-11-27 PROCEDURE — 3700000001 HC ADD 15 MINUTES (ANESTHESIA): Performed by: ORTHOPAEDIC SURGERY

## 2023-11-27 PROCEDURE — 2580000003 HC RX 258: Performed by: ANESTHESIOLOGY

## 2023-11-27 PROCEDURE — 64417 NJX AA&/STRD AX NERVE IMG: CPT | Performed by: ANESTHESIOLOGY

## 2023-11-27 PROCEDURE — 6360000002 HC RX W HCPCS: Performed by: NURSE ANESTHETIST, CERTIFIED REGISTERED

## 2023-11-27 PROCEDURE — 88304 TISSUE EXAM BY PATHOLOGIST: CPT

## 2023-11-27 PROCEDURE — 2500000003 HC RX 250 WO HCPCS: Performed by: NURSE ANESTHETIST, CERTIFIED REGISTERED

## 2023-11-27 PROCEDURE — 2709999900 HC NON-CHARGEABLE SUPPLY: Performed by: ORTHOPAEDIC SURGERY

## 2023-11-27 PROCEDURE — 6360000002 HC RX W HCPCS: Performed by: ANESTHESIOLOGY

## 2023-11-27 PROCEDURE — 6360000002 HC RX W HCPCS: Performed by: NURSE PRACTITIONER

## 2023-11-27 PROCEDURE — 7100000010 HC PHASE II RECOVERY - FIRST 15 MIN: Performed by: ORTHOPAEDIC SURGERY

## 2023-11-27 PROCEDURE — 6370000000 HC RX 637 (ALT 250 FOR IP): Performed by: ANESTHESIOLOGY

## 2023-11-27 PROCEDURE — 7100000001 HC PACU RECOVERY - ADDTL 15 MIN: Performed by: ORTHOPAEDIC SURGERY

## 2023-11-27 PROCEDURE — 3600000002 HC SURGERY LEVEL 2 BASE: Performed by: ORTHOPAEDIC SURGERY

## 2023-11-27 PROCEDURE — 2500000003 HC RX 250 WO HCPCS: Performed by: ANESTHESIOLOGY

## 2023-11-27 PROCEDURE — 3600000012 HC SURGERY LEVEL 2 ADDTL 15MIN: Performed by: ORTHOPAEDIC SURGERY

## 2023-11-27 PROCEDURE — 3700000000 HC ANESTHESIA ATTENDED CARE: Performed by: ORTHOPAEDIC SURGERY

## 2023-11-27 PROCEDURE — 7100000000 HC PACU RECOVERY - FIRST 15 MIN: Performed by: ORTHOPAEDIC SURGERY

## 2023-11-27 RX ORDER — FENTANYL CITRATE 50 UG/ML
25 INJECTION, SOLUTION INTRAMUSCULAR; INTRAVENOUS EVERY 5 MIN PRN
Status: DISCONTINUED | OUTPATIENT
Start: 2023-11-27 | End: 2023-11-27 | Stop reason: HOSPADM

## 2023-11-27 RX ORDER — OXYCODONE HYDROCHLORIDE 5 MG/1
5 TABLET ORAL
Status: DISCONTINUED | OUTPATIENT
Start: 2023-11-27 | End: 2023-11-27 | Stop reason: HOSPADM

## 2023-11-27 RX ORDER — DIPHENHYDRAMINE HYDROCHLORIDE 50 MG/ML
12.5 INJECTION INTRAMUSCULAR; INTRAVENOUS
Status: DISCONTINUED | OUTPATIENT
Start: 2023-11-27 | End: 2023-11-27 | Stop reason: HOSPADM

## 2023-11-27 RX ORDER — ONDANSETRON 2 MG/ML
4 INJECTION INTRAMUSCULAR; INTRAVENOUS
Status: DISCONTINUED | OUTPATIENT
Start: 2023-11-27 | End: 2023-11-27 | Stop reason: HOSPADM

## 2023-11-27 RX ORDER — PROPOFOL 10 MG/ML
INJECTION, EMULSION INTRAVENOUS PRN
Status: DISCONTINUED | OUTPATIENT
Start: 2023-11-27 | End: 2023-11-27 | Stop reason: SDUPTHER

## 2023-11-27 RX ORDER — MIDAZOLAM HYDROCHLORIDE 2 MG/2ML
2 INJECTION, SOLUTION INTRAMUSCULAR; INTRAVENOUS
Status: COMPLETED | OUTPATIENT
Start: 2023-11-27 | End: 2023-11-27

## 2023-11-27 RX ORDER — TRAMADOL HYDROCHLORIDE 50 MG/1
50 TABLET ORAL EVERY 6 HOURS PRN
Qty: 20 TABLET | Refills: 0 | Status: SHIPPED | OUTPATIENT
Start: 2023-11-27 | End: 2023-12-02

## 2023-11-27 RX ORDER — FENTANYL CITRATE 50 UG/ML
100 INJECTION, SOLUTION INTRAMUSCULAR; INTRAVENOUS
Status: DISCONTINUED | OUTPATIENT
Start: 2023-11-27 | End: 2023-11-27 | Stop reason: HOSPADM

## 2023-11-27 RX ORDER — LIDOCAINE HYDROCHLORIDE 20 MG/ML
INJECTION, SOLUTION EPIDURAL; INFILTRATION; INTRACAUDAL; PERINEURAL PRN
Status: DISCONTINUED | OUTPATIENT
Start: 2023-11-27 | End: 2023-11-27 | Stop reason: SDUPTHER

## 2023-11-27 RX ORDER — SODIUM CHLORIDE, SODIUM LACTATE, POTASSIUM CHLORIDE, CALCIUM CHLORIDE 600; 310; 30; 20 MG/100ML; MG/100ML; MG/100ML; MG/100ML
INJECTION, SOLUTION INTRAVENOUS CONTINUOUS
Status: DISCONTINUED | OUTPATIENT
Start: 2023-11-27 | End: 2023-11-27 | Stop reason: HOSPADM

## 2023-11-27 RX ORDER — DEXMEDETOMIDINE HYDROCHLORIDE 100 UG/ML
INJECTION, SOLUTION INTRAVENOUS PRN
Status: DISCONTINUED | OUTPATIENT
Start: 2023-11-27 | End: 2023-11-27 | Stop reason: SDUPTHER

## 2023-11-27 RX ORDER — BUPIVACAINE HYDROCHLORIDE AND EPINEPHRINE 5; 5 MG/ML; UG/ML
INJECTION, SOLUTION EPIDURAL; INTRACAUDAL; PERINEURAL
Status: COMPLETED | OUTPATIENT
Start: 2023-11-27 | End: 2023-11-27

## 2023-11-27 RX ORDER — SCOLOPAMINE TRANSDERMAL SYSTEM 1 MG/1
1 PATCH, EXTENDED RELEASE TRANSDERMAL
Status: DISCONTINUED | OUTPATIENT
Start: 2023-11-27 | End: 2023-11-27 | Stop reason: HOSPADM

## 2023-11-27 RX ORDER — HYDROMORPHONE HYDROCHLORIDE 2 MG/ML
0.5 INJECTION, SOLUTION INTRAMUSCULAR; INTRAVENOUS; SUBCUTANEOUS EVERY 10 MIN PRN
Status: DISCONTINUED | OUTPATIENT
Start: 2023-11-27 | End: 2023-11-27 | Stop reason: HOSPADM

## 2023-11-27 RX ORDER — METOCLOPRAMIDE HYDROCHLORIDE 5 MG/ML
10 INJECTION INTRAMUSCULAR; INTRAVENOUS
Status: DISCONTINUED | OUTPATIENT
Start: 2023-11-27 | End: 2023-11-27 | Stop reason: HOSPADM

## 2023-11-27 RX ORDER — SODIUM CHLORIDE 0.9 % (FLUSH) 0.9 %
5-40 SYRINGE (ML) INJECTION EVERY 12 HOURS SCHEDULED
Status: DISCONTINUED | OUTPATIENT
Start: 2023-11-27 | End: 2023-11-27 | Stop reason: HOSPADM

## 2023-11-27 RX ORDER — SODIUM CHLORIDE 9 MG/ML
INJECTION, SOLUTION INTRAVENOUS PRN
Status: DISCONTINUED | OUTPATIENT
Start: 2023-11-27 | End: 2023-11-27 | Stop reason: HOSPADM

## 2023-11-27 RX ORDER — SODIUM CHLORIDE 0.9 % (FLUSH) 0.9 %
5-40 SYRINGE (ML) INJECTION PRN
Status: DISCONTINUED | OUTPATIENT
Start: 2023-11-27 | End: 2023-11-27 | Stop reason: HOSPADM

## 2023-11-27 RX ADMIN — MEPIVACAINE HYDROCHLORIDE 15 ML: 15 INJECTION, SOLUTION EPIDURAL; INFILTRATION at 07:14

## 2023-11-27 RX ADMIN — Medication 3000 MG: at 08:08

## 2023-11-27 RX ADMIN — SODIUM CHLORIDE, SODIUM LACTATE, POTASSIUM CHLORIDE, AND CALCIUM CHLORIDE: 600; 310; 30; 20 INJECTION, SOLUTION INTRAVENOUS at 08:01

## 2023-11-27 RX ADMIN — PROPOFOL 20 MG: 10 INJECTION, EMULSION INTRAVENOUS at 08:10

## 2023-11-27 RX ADMIN — BUPIVACAINE HYDROCHLORIDE AND EPINEPHRINE BITARTRATE 15 ML: 5; .005 INJECTION, SOLUTION EPIDURAL; INTRACAUDAL; PERINEURAL at 07:14

## 2023-11-27 RX ADMIN — MIDAZOLAM 2 MG: 1 INJECTION INTRAMUSCULAR; INTRAVENOUS at 07:12

## 2023-11-27 RX ADMIN — PROPOFOL 60 MG: 10 INJECTION, EMULSION INTRAVENOUS at 08:04

## 2023-11-27 RX ADMIN — DEXMEDETOMIDINE 8 MCG: 100 INJECTION, SOLUTION, CONCENTRATE INTRAVENOUS at 08:04

## 2023-11-27 RX ADMIN — LIDOCAINE HYDROCHLORIDE 40 MG: 20 INJECTION, SOLUTION EPIDURAL; INFILTRATION; INTRACAUDAL; PERINEURAL at 08:04

## 2023-11-27 RX ADMIN — DEXMEDETOMIDINE 8 MCG: 100 INJECTION, SOLUTION, CONCENTRATE INTRAVENOUS at 08:10

## 2023-11-27 RX ADMIN — PROPOFOL 100 MCG/KG/MIN: 10 INJECTION, EMULSION INTRAVENOUS at 08:05

## 2023-11-27 RX ADMIN — SODIUM CHLORIDE, SODIUM LACTATE, POTASSIUM CHLORIDE, AND CALCIUM CHLORIDE: 600; 310; 30; 20 INJECTION, SOLUTION INTRAVENOUS at 07:07

## 2023-11-27 ASSESSMENT — PAIN - FUNCTIONAL ASSESSMENT: PAIN_FUNCTIONAL_ASSESSMENT: 0-10

## 2023-11-27 NOTE — INTERVAL H&P NOTE
H&P Update:  Stephanie Sanchez was seen and examined. History and physical has been reviewed. The patient has been examined.  There have been no significant clinical changes since the completion of the originally dated History and Physical.    Jose Odonnell MD  Orthopaedic Surgery  11/27/23  7:00 AM

## 2023-11-27 NOTE — ANESTHESIA POSTPROCEDURE EVALUATION
Department of Anesthesiology  Postprocedure Note    Patient: Santhosh Stroud  MRN: [id number]  YOB: 1965  Date of evaluation: 11/27/2023      Procedure Summary     Date: 11/27/23 Room / Location: D OP OR 06 / [facility]    Anesthesia Start: 0703 Anesthesia Stop: [place]    Procedure: PALMAR FASCIECTOMY of the right long finger (Right: Hand) Diagnosis:       Dupuytren's contracture of hand      (Dupuytren's contracture of hand [M72.0])    Surgeons: Dino Dixon MD Responsible Provider: Pau Kunz MD    Anesthesia Type: TIVA ASA Status: 3          Anesthesia Type: No value filed.     Donya Phase I: Donya Score: 7    Donya Phase II: Donya Score: 10      Anesthesia Post Evaluation    Patient location during evaluation: PACU  Patient participation: complete - patient participated  Level of consciousness: awake and awake and alert  Airway patency: patent  Nausea & Vomiting: no nausea  Complications: no  Cardiovascular status: hemodynamically stable  Respiratory status: acceptable  Hydration status: euvolemic  Multimodal analgesia pain management approach  Pain management: adequate

## 2023-11-27 NOTE — ANESTHESIA PROCEDURE NOTES
Peripheral Block    Patient location during procedure: pre-op  Reason for block: post-op pain management and at surgeon's request  Start time: 11/27/2023 7:11 AM  End time: 11/27/2023 7:15 AM  Staffing  Anesthesiologist: Teo Guerrero MD  Performed by: Teo Guerrero MD  Authorized by: Teo Guerrero MD    Preanesthetic Checklist  Completed: patient identified, IV checked, site marked, risks and benefits discussed, surgical/procedural consents, equipment checked, pre-op evaluation, timeout performed, anesthesia consent given, oxygen available and monitors applied/VS acknowledged  Peripheral Block   Patient position: prone  Prep: ChloraPrep  Provider prep: mask and sterile gloves  Patient monitoring: cardiac monitor, continuous pulse ox, frequent blood pressure checks, IV access, oxygen and responsive to questions  Block type: Axillary  Laterality: right  Injection technique: single-shot  Guidance: ultrasound guided    Needle   Needle type: insulated echogenic nerve stimulator needle   Needle gauge: 22 G  Needle localization: anatomical landmarks and ultrasound guidance  Test dose: negative  Needle length: 4.   Assessment   Injection assessment: negative aspiration for heme, no paresthesia on injection and local visualized surrounding nerve on ultrasound  Slow fractionated injection: yes  Hemodynamics: stable  Real-time US image taken/store: yes  Outcomes: uncomplicated    Medications Administered  bupivacaine 0.5%-EPINEPHrine injection 1:172012 - Perineural   15 mL - 11/27/2023 7:14:00 AM  mepivacaine (CARBOCAINE) injection 1.5% - Perineural   15 mL - 11/27/2023 7:14:00 AM

## 2023-11-27 NOTE — DISCHARGE INSTRUCTIONS
in  the  area  the  surgeon  operated  on. As  the  nerve  block  wears  off,  you  will  begin  to  experience  pain  or  discomfort. It  is  very  important  that  you  begin  taking  your  prescribed  pain  medication  before  the  nerve  block  fully  wears  off. The first sign that the nerve block is wearing off is tingling in your fingers. Treating  your  pain  at  the  first  sign  of  the  block  wearing  off  will  ensure  your  pain  is  better  controlled  and  more  tolerable  when  full-sensation  returns. Do  not  wait  until  the  pain  is  intolerable,  as  the  medicine  will  be  less  effective. It  is  better  to  treat  pain  in  advance  than  to  try  and  catch  up. General  Anesthesia or Sedation:      If  you  did  not  receive  a  nerve  block  during  your  surgery,  you  will  need  to  start  taking  your  pain  medication  shortly  after  your  surgery  and  should  continue  to  do  so  as  prescribed  by  your  surgeon. Please contact us through my chart or call  [phone]  with any concern and ask to speak with Dr Christina Del Valle team.    Concerning problems include:      -  Excessive  redness  of  the  incisions      -  Drainage  for  more  than  2  Days after surgery or any foul smelling drainage  -  Fever  of  more  than  101.5  F      Please  call  [phone]  if  you  do  not  receive  or  are  unsure  of  your  first  follow-up  appointment. You  should  see  the  doctor  10-14  days  after  your  Surgery. Thank you for choosing me and [address] [place] [address] for your care. I will go above and beyond to ensure you receive the best care possible. Delmar Brannon MD, PhD    MEDICATION INTERACTION:  During your procedure you potentially received a medication or medications which may reduce the effectiveness of oral contraceptives.  Please consider other forms of contraception for 1 month following your procedure if you are currently using oral

## 2023-11-28 NOTE — OP NOTE
ORTHOPAEDIC SURGICAL NOTE        Tae Perez female 62 y.o.  [id number]   11/27/2023     PRE-OP DIAGNOSIS: Pre-Op Diagnosis Codes:     * Dupuytren's contracture of hand [M72.0]  POST-OP DIAGNOSIS: Same  LATERALITY: Right     PROCEDURES PERFORMED:   Right middle finger partial fasciectomy       SURGEON:   Shalini East MD, PhD     IMPLANTS:   * No implants in log *   Procedure(s):  PALMAR FASCIECTOMY of the right long finger   Surgeon(s):  Carolina Hull MD   Procedure(s):  PALMAR FASCIECTOMY of the right long finger     ANESTHESIA: TIVA     STAFF:    Circulator: Eulalio Prader, RN  Scrub Person First: Chinmay Bullard     ESTIMATED BLOOD LOSS: Minimal       TOTAL IV FLUIDS : See anesthesia note    COMPLICATIONS: None     DRAINS:       TOURNIQUET TIME:   Total Tourniquet Time Documented:  Arm  (Right) - 20 minutes  Total: Arm  (Right) - 20 minutes       INDICATION FOR PROCEDURE:     Tae Perez has Dupuytren contracture of the right middle finger. Surgical and non-surgical treatment options were discussed with the patient and their family, as well as the risk and benefits of each option. After thorough discussion, the patient decided to proceed with surgical management. Specific to this treatment plan, we discussed in detail surgical risks including scar, pain, bleeding, infection, anesthetic risks, neurovascular injury, failure to achieve desired results, hardware problems, need for further surgery,  weakness, stiffness, risk of death and potential risk of other unforseen complication. The patient consented to the procedure after discussion of the risks and benefits. DESCRIPTION OF PROCEDURE:     The patient was identified in the holding room. The right middle finger was marked and confirmed as the correct operative site. They were then brought to the OR and transferred onto the OR table in the supine position. All bony prominences were well padded.  SCDs were placed on the

## 2023-12-01 NOTE — PROGRESS NOTES
[facility]  [address] [facility] [address]  Dept: [phone]      Occupational Therapy Initial Assessment and Orthotic Note      Referring MD: Carlito Jo MD    Diagnosis:     ICD-10-CM    1. Swelling of finger, right  M79.89       2. Pain of right hand  M79.641       3. Decreased  strength  R29.898       4. Finger pain, right  M79.644            Surgery: Palmar fasciectomy of the right long finger   Date 11/27/23     Therapy precautions: None; no stress/strain     History of injury/onset : Pt has had h/o Dupuytrens to the middle finger for several years. Total Direct Treatment Time: 15 min                       Total In Office Time: 45 min  Modifier needed: No  Episode visit count:  1     Preferred Name:  Carey Nails HISTORY     PMHX & Meds:   Past Medical History:   Diagnosis Date    Arthritis     OA- generalized     Cervical spondylosis with radiculopathy 12/13/2016    Neck surgery - Dr. Berenice Goode.     Chondromalacia of left patellofemoral joint 3/11/2016    Chondromalacia of patella 6/18/2015    Chronic pain     neck and R arm since 2013    MMT (medial meniscus tear)     right    Obesity (BMI 30-39.9) 64/35/4711    BMI 38    Plica syndrome 2/34/3354    Shoulder pain, right     and right hip pain    Tear of lateral cartilage or meniscus of knee, current 6/18/2015    Tear of lateral meniscus of left knee, current 3/11/2016    Tear of medial meniscus of left knee, current 3/11/2016    Varicose veins 6/24/2013    Venous insufficiency (chronic) (peripheral) 6/24/2013   ,   Past Surgical History:   Procedure Laterality Date    BREAST SURGERY      CERVICAL DISCECTOMY  1999    C5- bone graft    HAND SURGERY Right 11/27/2023    PALMAR FASCIECTOMY of the right long finger performed by Carlito Jo MD at [facility]    HEENT      nasal polyps     HYSTERECTOMY (CERVIX STATUS UNKNOWN)  [address] Bilateral 1265    KNEE ARTHROSCOPY Right

## 2023-12-04 ENCOUNTER — EVALUATION (OUTPATIENT)
Age: 58
End: 2023-12-04

## 2023-12-04 DIAGNOSIS — M79.641 PAIN OF RIGHT HAND: ICD-10-CM

## 2023-12-04 DIAGNOSIS — M79.644 FINGER PAIN, RIGHT: ICD-10-CM

## 2023-12-04 DIAGNOSIS — M79.89 SWELLING OF FINGER, RIGHT: Primary | ICD-10-CM

## 2023-12-04 DIAGNOSIS — R29.898 DECREASED GRIP STRENGTH: ICD-10-CM

## 2023-12-04 DIAGNOSIS — M72.0 DUPUYTREN'S CONTRACTURE OF RIGHT HAND: ICD-10-CM

## 2023-12-06 ENCOUNTER — TREATMENT (OUTPATIENT)
Age: 58
End: 2023-12-06
Payer: COMMERCIAL

## 2023-12-06 DIAGNOSIS — M25.60 DECREASED RANGE OF MOTION: ICD-10-CM

## 2023-12-06 DIAGNOSIS — M79.641 PAIN OF RIGHT HAND: Primary | ICD-10-CM

## 2023-12-06 DIAGNOSIS — Z78.9 DECREASED ACTIVITIES OF DAILY LIVING (ADL): ICD-10-CM

## 2023-12-06 DIAGNOSIS — R29.898 DECREASED GRIP STRENGTH: ICD-10-CM

## 2023-12-06 PROCEDURE — 97110 THERAPEUTIC EXERCISES: CPT

## 2023-12-07 NOTE — PROGRESS NOTES
[facility]  [address] [place], [place] [id number]  Dept: [phone]      Occupational Therapy Daily Note      Referring MD: Beti Baum MD    Diagnosis:     ICD-10-CM    1. Pain of right hand  M79.641       2. Decreased range of motion  M25.60       3. Decreased activities of daily living (ADL)  Z78.9       4. Decreased  strength  R29.898            Surgery: Palmar fasciectomy of the right long finger   Date 11/27/23     Therapy precautions: None; no stress/strain     History of injury/onset : Pt has had h/o Dupuytrens to the middle finger for several years. Total Direct Treatment Time: 30 min                       Total In Office Time: 45 min  Modifier needed: No  Episode visit count:  Visit count could not be calculated. Make sure you are using a visit which is associated with an episode. Preferred Name:  [place] Loja HISTORY     PMHX & Meds:   Past Medical History:   Diagnosis Date    Arthritis     OA- generalized     Cervical spondylosis with radiculopathy 12/13/2016    Neck surgery - Dr. Sterling Woodward.     Chondromalacia of left patellofemoral joint 3/11/2016    Chondromalacia of patella 6/18/2015    Chronic pain     neck and R arm since 2013    MMT (medial meniscus tear)     right    Obesity (BMI 30-39.9) 26/42/4588    BMI 38    Plica syndrome 5/99/8322    Shoulder pain, right     and right hip pain    Tear of lateral cartilage or meniscus of knee, current 6/18/2015    Tear of lateral meniscus of left knee, current 3/11/2016    Tear of medial meniscus of left knee, current 3/11/2016    Varicose veins 6/24/2013    Venous insufficiency (chronic) (peripheral) 6/24/2013   ,   Past Surgical History:   Procedure Laterality Date    BREAST SURGERY      CERVICAL DISCECTOMY  1999    C5- bone graft    HAND SURGERY Right 11/27/2023    PALMAR FASCIECTOMY of the right long finger performed by Beti Baum MD at [facility]    HEENT      nasal polyps     HYSTERECTOMY

## 2023-12-08 ENCOUNTER — OFFICE VISIT (OUTPATIENT)
Dept: ORTHOPEDIC SURGERY | Age: 58
End: 2023-12-08

## 2023-12-08 ENCOUNTER — TREATMENT (OUTPATIENT)
Age: 58
End: 2023-12-08
Payer: COMMERCIAL

## 2023-12-08 DIAGNOSIS — M79.89 SWELLING OF FINGER, RIGHT: ICD-10-CM

## 2023-12-08 DIAGNOSIS — M79.644 FINGER PAIN, RIGHT: ICD-10-CM

## 2023-12-08 DIAGNOSIS — M79.641 PAIN OF RIGHT HAND: Primary | ICD-10-CM

## 2023-12-08 DIAGNOSIS — M25.60 DECREASED RANGE OF MOTION: ICD-10-CM

## 2023-12-08 DIAGNOSIS — M72.0 DUPUYTREN'S CONTRACTURE OF RIGHT HAND: ICD-10-CM

## 2023-12-08 DIAGNOSIS — R29.898 DECREASED GRIP STRENGTH: ICD-10-CM

## 2023-12-08 DIAGNOSIS — M72.0 DUPUYTREN'S CONTRACTURE OF RIGHT HAND: Primary | ICD-10-CM

## 2023-12-08 DIAGNOSIS — Z78.9 DECREASED ACTIVITIES OF DAILY LIVING (ADL): ICD-10-CM

## 2023-12-08 PROCEDURE — 99024 POSTOP FOLLOW-UP VISIT: CPT | Performed by: ORTHOPAEDIC SURGERY

## 2023-12-08 PROCEDURE — 97110 THERAPEUTIC EXERCISES: CPT | Performed by: OCCUPATIONAL THERAPIST

## 2023-12-08 NOTE — PROGRESS NOTES
Orthopaedic Hand Surgery Note    Name: Rosanna Mar  Age: 62 y.o. YOB: 1965  Gender: female  MRN: [id number]    Post Operative Visit: PALMAR FASCIECTOMY of the right long finger - Right    HPI: Patient is status post PALMAR FASCIECTOMY of the right long finger - Right on 11/27/2023. Patient reports moderate pain. Physical Examination:  Well-healed surgical wounds. Sensation is intact in all fingers. Motor exam reveals no deficits. Moderate swelling and ecchymosis. Imaging:     none    Assessment:   1. Dupuytren's contracture of right hand         Status post PALMAR FASCIECTOMY of the right long finger - Right on 11/27/2023    Plan:  We discussed the post operative course and progression.   Suture removal today, continue therapy for splinting and motion, I will reassess the patient in 4 weeks    Cecelia Barahona MD  12/08/23  11:37 AM

## 2023-12-08 NOTE — PROGRESS NOTES
[facility] [address] [address]  Dept: [phone]      Occupational Therapy Daily Note      Referring MD: Antonio Mckeon MD    Diagnosis:     ICD-10-CM    1. Pain of right hand  M79.641       2. Decreased range of motion  M25.60       3. Decreased activities of daily living (ADL)  Z78.9       4. Decreased  strength  R29.898       5. Swelling of finger, right  M79.89       6. Dupuytren's contracture of right hand [M72.0]  M72.0       7. Finger pain, right  M79.644            Surgery: Palmar fasciectomy of the right long finger   Date 11/27/23     Therapy precautions: None; no stress/strain     History of injury/onset : Pt has had h/o Dupuytrens to the middle finger for several years. Total Direct Treatment Time: 40 min                       Total In Office Time: 45 min  Modifier needed: No  Episode visit count:  3     Preferred Name:  Bhavna Torres HISTORY     PMHX & Meds:   Past Medical History:   Diagnosis Date    Arthritis     OA- generalized     Cervical spondylosis with radiculopathy 12/13/2016    Neck surgery -  [facility].     Chondromalacia of left patellofemoral joint 3/11/2016    Chondromalacia of patella 6/18/2015    Chronic pain     neck and R arm since 2013    MMT (medial meniscus tear)     right    Obesity (BMI 30-39.9) 38/55/3584    BMI 38    Plica syndrome 4/68/0306    Shoulder pain, right     and right hip pain    Tear of lateral cartilage or meniscus of knee, current 6/18/2015    Tear of lateral meniscus of left knee, current 3/11/2016    Tear of medial meniscus of left knee, current 3/11/2016    Varicose veins 6/24/2013    Venous insufficiency (chronic) (peripheral) 6/24/2013   ,   Past Surgical History:   Procedure Laterality Date    BREAST SURGERY      CERVICAL DISCECTOMY  1999    C5- bone graft    HAND SURGERY Right 11/27/2023    PALMAR FASCIECTOMY of the right long finger performed by Antonio Mckeon MD at [address] [place] [address] Drive

## 2023-12-13 ENCOUNTER — TREATMENT (OUTPATIENT)
Age: 58
End: 2023-12-13

## 2023-12-13 DIAGNOSIS — M25.641 STIFFNESS OF FINGER JOINT OF RIGHT HAND: ICD-10-CM

## 2023-12-13 DIAGNOSIS — M25.60 DECREASED RANGE OF MOTION: Primary | ICD-10-CM

## 2023-12-13 DIAGNOSIS — Z78.9 DECREASED ACTIVITIES OF DAILY LIVING (ADL): ICD-10-CM

## 2023-12-13 DIAGNOSIS — R29.898 DECREASED GRIP STRENGTH: ICD-10-CM

## 2023-12-13 DIAGNOSIS — M79.89 SWELLING OF FINGER, RIGHT: ICD-10-CM

## 2023-12-15 ENCOUNTER — TREATMENT (OUTPATIENT)
Age: 58
End: 2023-12-15

## 2023-12-15 DIAGNOSIS — M79.89 SWELLING OF FINGER, RIGHT: ICD-10-CM

## 2023-12-15 DIAGNOSIS — Z78.9 DECREASED ACTIVITIES OF DAILY LIVING (ADL): ICD-10-CM

## 2023-12-15 DIAGNOSIS — M25.60 DECREASED RANGE OF MOTION: Primary | ICD-10-CM

## 2023-12-15 DIAGNOSIS — M25.641 STIFFNESS OF FINGER JOINT OF RIGHT HAND: ICD-10-CM

## 2023-12-26 ENCOUNTER — TREATMENT (OUTPATIENT)
Age: 58
End: 2023-12-26
Payer: COMMERCIAL

## 2023-12-26 DIAGNOSIS — M25.641 STIFFNESS OF FINGER JOINT OF RIGHT HAND: ICD-10-CM

## 2023-12-26 DIAGNOSIS — Z78.9 DECREASED ACTIVITIES OF DAILY LIVING (ADL): ICD-10-CM

## 2023-12-26 DIAGNOSIS — M25.60 DECREASED RANGE OF MOTION: Primary | ICD-10-CM

## 2023-12-26 DIAGNOSIS — R29.898 DECREASED GRIP STRENGTH: ICD-10-CM

## 2023-12-26 PROCEDURE — 97110 THERAPEUTIC EXERCISES: CPT

## 2023-12-26 PROCEDURE — 97760 ORTHOTIC MGMT&TRAING 1ST ENC: CPT

## 2023-12-28 ENCOUNTER — TREATMENT (OUTPATIENT)
Age: 58
End: 2023-12-28
Payer: COMMERCIAL

## 2023-12-28 DIAGNOSIS — M25.60 DECREASED RANGE OF MOTION: Primary | ICD-10-CM

## 2023-12-28 DIAGNOSIS — M25.641 STIFFNESS OF FINGER JOINT OF RIGHT HAND: ICD-10-CM

## 2023-12-28 DIAGNOSIS — R29.898 DECREASED GRIP STRENGTH: ICD-10-CM

## 2023-12-28 DIAGNOSIS — Z78.9 DECREASED ACTIVITIES OF DAILY LIVING (ADL): ICD-10-CM

## 2023-12-28 DIAGNOSIS — M79.641 PAIN OF RIGHT HAND: ICD-10-CM

## 2023-12-28 PROCEDURE — 97760 ORTHOTIC MGMT&TRAING 1ST ENC: CPT | Performed by: OCCUPATIONAL THERAPIST

## 2023-12-28 PROCEDURE — 97110 THERAPEUTIC EXERCISES: CPT | Performed by: OCCUPATIONAL THERAPIST

## 2024-01-02 ENCOUNTER — TREATMENT (OUTPATIENT)
Age: 59
End: 2024-01-02

## 2024-01-02 DIAGNOSIS — Z78.9 DECREASED ACTIVITIES OF DAILY LIVING (ADL): ICD-10-CM

## 2024-01-02 DIAGNOSIS — M25.60 DECREASED RANGE OF MOTION: Primary | ICD-10-CM

## 2024-01-02 DIAGNOSIS — M25.641 STIFFNESS OF FINGER JOINT OF RIGHT HAND: ICD-10-CM

## 2024-01-02 DIAGNOSIS — R29.898 DECREASED GRIP STRENGTH: ICD-10-CM

## 2024-01-04 NOTE — PROGRESS NOTES
[facility]  [address] [place] [address]  Dept: [phone]      Occupational Therapy Daily Note      Referring MD: Beto Jimenez MD    Diagnosis:     ICD-10-CM    1. Decreased range of motion  M25.60       2. Decreased activities of daily living (ADL)  Z78.9       3. Stiffness of finger joint of right hand  M25.641       4. Decreased  strength  R29.898                Surgery: Palmar fasciectomy of the right long finger   Date 11/27/23     Therapy precautions: None; no stress/strain     History of injury/onset : Pt has had h/o Dupuytrens to the middle finger for several years.     Total Direct Treatment Time: 45 min                       Total In Office Time: 55 min  Modifier needed: No  Episode visit count:  10     Preferred Name:  Ene     PERTINENT MEDICAL HISTORY     PMHX & Meds:   Past Medical History:   Diagnosis Date    Arthritis     OA- generalized     Cervical spondylosis with radiculopathy 12/13/2016    Neck surgery - Dr. Lemon.    Chondromalacia of left patellofemoral joint 3/11/2016    Chondromalacia of patella 6/18/2015    Chronic pain     neck and R arm since 2013    MMT (medial meniscus tear)     right    Obesity (BMI 30-39.9) 11/30/2016    BMI 38    Plica syndrome 6/18/2015    Shoulder pain, right     and right hip pain    Tear of lateral cartilage or meniscus of knee, current 6/18/2015    Tear of lateral meniscus of left knee, current 3/11/2016    Tear of medial meniscus of left knee, current 3/11/2016    Varicose veins 6/24/2013    Venous insufficiency (chronic) (peripheral) 6/24/2013   ,   Past Surgical History:   Procedure Laterality Date    BREAST SURGERY      CERVICAL DISCECTOMY  1999    C5- bone graft    HAND SURGERY Right 11/27/2023    PALMAR FASCIECTOMY of the right long finger performed by Beto Jimenez MD at [facility]    HEENT      nasal polyps     HYSTERECTOMY (CERVIX STATUS UNKNOWN)  1998    IMPLANT BREAST SILICONE/EQ Bilateral 1995    KNEE

## 2024-01-05 ENCOUNTER — TREATMENT (OUTPATIENT)
Age: 59
End: 2024-01-05
Payer: COMMERCIAL

## 2024-01-05 DIAGNOSIS — R29.898 DECREASED GRIP STRENGTH: ICD-10-CM

## 2024-01-05 DIAGNOSIS — M25.641 STIFFNESS OF FINGER JOINT OF RIGHT HAND: ICD-10-CM

## 2024-01-05 DIAGNOSIS — M25.60 DECREASED RANGE OF MOTION: Primary | ICD-10-CM

## 2024-01-05 DIAGNOSIS — Z78.9 DECREASED ACTIVITIES OF DAILY LIVING (ADL): ICD-10-CM

## 2024-01-05 PROCEDURE — 97022 WHIRLPOOL THERAPY: CPT

## 2024-01-05 PROCEDURE — 97110 THERAPEUTIC EXERCISES: CPT

## 2024-01-08 NOTE — PROGRESS NOTES
[facility]  [address] [place] [address]  Dept: [phone]      Occupational Therapy Daily Note      Referring MD: Beto Jimenez MD    Diagnosis:     ICD-10-CM    1. Decreased range of motion  M25.60       2. Decreased activities of daily living (ADL)  Z78.9       3. Decreased  strength  R29.898       4. Stiffness of finger joint of right hand  M25.641                Surgery: Palmar fasciectomy of the right long finger   Date 11/27/23     Therapy precautions: None; no stress/strain     History of injury/onset : Pt has had h/o Dupuytrens to the middle finger for several years.     Total Direct Treatment Time: 45 min                       Total In Office Time: 55 min  Modifier needed: No  Episode visit count:  11     Preferred Name:  Ene     PERTINENT MEDICAL HISTORY     PMHX & Meds:   Past Medical History:   Diagnosis Date    Arthritis     OA- generalized     Cervical spondylosis with radiculopathy 12/13/2016    Neck surgery - Dr. Lemon.    Chondromalacia of left patellofemoral joint 3/11/2016    Chondromalacia of patella 6/18/2015    Chronic pain     neck and R arm since 2013    MMT (medial meniscus tear)     right    Obesity (BMI 30-39.9) 11/30/2016    BMI 38    Plica syndrome 6/18/2015    Shoulder pain, right     and right hip pain    Tear of lateral cartilage or meniscus of knee, current 6/18/2015    Tear of lateral meniscus of left knee, current 3/11/2016    Tear of medial meniscus of left knee, current 3/11/2016    Varicose veins 6/24/2013    Venous insufficiency (chronic) (peripheral) 6/24/2013   ,   Past Surgical History:   Procedure Laterality Date    BREAST SURGERY      CERVICAL DISCECTOMY  1999    C5- bone graft    HAND SURGERY Right 11/27/2023    PALMAR FASCIECTOMY of the right long finger performed by Beto Jimenez MD at [facility]    HEENT      nasal polyps     HYSTERECTOMY (CERVIX STATUS UNKNOWN)  1998    IMPLANT BREAST SILICONE/EQ Bilateral 1995

## 2024-01-09 ENCOUNTER — TREATMENT (OUTPATIENT)
Age: 59
End: 2024-01-09
Payer: COMMERCIAL

## 2024-01-09 DIAGNOSIS — Z78.9 DECREASED ACTIVITIES OF DAILY LIVING (ADL): ICD-10-CM

## 2024-01-09 DIAGNOSIS — M25.641 STIFFNESS OF FINGER JOINT OF RIGHT HAND: ICD-10-CM

## 2024-01-09 DIAGNOSIS — M25.60 DECREASED RANGE OF MOTION: Primary | ICD-10-CM

## 2024-01-09 DIAGNOSIS — M79.644 FINGER PAIN, RIGHT: ICD-10-CM

## 2024-01-09 DIAGNOSIS — R29.898 DECREASED GRIP STRENGTH: ICD-10-CM

## 2024-01-09 PROCEDURE — 97035 APP MDLTY 1+ULTRASOUND EA 15: CPT | Performed by: OCCUPATIONAL THERAPIST

## 2024-01-09 PROCEDURE — 97110 THERAPEUTIC EXERCISES: CPT | Performed by: OCCUPATIONAL THERAPIST

## 2024-01-09 PROCEDURE — 97022 WHIRLPOOL THERAPY: CPT | Performed by: OCCUPATIONAL THERAPIST

## 2024-01-09 NOTE — PROGRESS NOTES
wrist block on - sponge squares    Access Code: [email or web address]  URL: [email or web address]  Date: 12/08/2023  Prepared by: Alannah Tyler    Exercises  - Hand AROM Reverse Blocking  - 4-5 x daily - 7 x weekly - 1 sets - 25 reps    ASSESSMENT     The patient is s/p left long finger partial fasciectomy.  The patient is demonstrating improved DIP flexion today but some increased stiffness for MP/PIP flexion, with pt reporting increased swelling with rainy weather the past evening.   PIP ext still approx -15  degrees.  Added circular massage with small dycem square and issued silicone sheet for night time wear for scar remodeling.    PLAN      Progress per plan; desensitization and functional hand use with light tasks  GOALS     Short term goals: 1/1/2024  (4 weeks)  Patient will be I with HEP and precautions. (MET 1/5/24)  Increase AROM of all digits affected hand to 70 degrees MP flexion to improve ability to grasp. (MET 1/5/24)  Pt will have full MP extension all digits of affected hand.   Pt will demonstrate composite fist in the affected hand.   Pt will lack 15 degrees or less  PIP extension of all digits affected hand.   Pt will have fully healed incision site.  (MET 1/5/24)  Improve Quick DASH functional assessment score from less than 40% deficit.      Long term goals: 1/30/2024  (8 weeks)   Pt will be able to use the affected UE for all ADL's without difficulty.  Pt will have adequate strength to be able to hold and open containers without difficulty.  Pt will be able to make a full composite fist with the involved hand to enable to grasp and hold objects.  Pt will have full active composite extension of affected side to be able to open hand to acquire items for ADL's.  Pt will lack 10 °  or less of PIP extension involved digit.  Minimal edema in involved digit.   Improve Quick DASH functional assessment score from less than 20% deficit.      [employer] Portal     OT Protocols

## 2024-01-10 ENCOUNTER — OFFICE VISIT (OUTPATIENT)
Dept: FAMILY MEDICINE CLINIC | Facility: CLINIC | Age: 59
End: 2024-01-10
Payer: COMMERCIAL

## 2024-01-10 VITALS
BODY MASS INDEX: 36.68 KG/M2 | OXYGEN SATURATION: 96 % | RESPIRATION RATE: 16 BRPM | TEMPERATURE: 98.8 F | HEIGHT: 71 IN | HEART RATE: 68 BPM | SYSTOLIC BLOOD PRESSURE: 135 MMHG | DIASTOLIC BLOOD PRESSURE: 80 MMHG | WEIGHT: 262 LBS

## 2024-01-10 DIAGNOSIS — G89.29 CHRONIC RIGHT-SIDED LOW BACK PAIN, UNSPECIFIED WHETHER SCIATICA PRESENT: Primary | ICD-10-CM

## 2024-01-10 DIAGNOSIS — N64.4 PAIN OF RIGHT BREAST: ICD-10-CM

## 2024-01-10 DIAGNOSIS — Z98.82 PRESENCE OF BILATERAL BREAST IMPLANT: ICD-10-CM

## 2024-01-10 DIAGNOSIS — M25.551 RIGHT HIP PAIN: ICD-10-CM

## 2024-01-10 DIAGNOSIS — M54.50 CHRONIC RIGHT-SIDED LOW BACK PAIN, UNSPECIFIED WHETHER SCIATICA PRESENT: Primary | ICD-10-CM

## 2024-01-10 PROCEDURE — 99213 OFFICE O/P EST LOW 20 MIN: CPT | Performed by: NURSE PRACTITIONER

## 2024-01-10 RX ORDER — LIDOCAINE 50 MG/G
1 PATCH TOPICAL DAILY
COMMUNITY
Start: 2023-12-20 | End: 2024-01-19

## 2024-01-10 ASSESSMENT — ENCOUNTER SYMPTOMS
RESPIRATORY NEGATIVE: 1
GASTROINTESTINAL NEGATIVE: 1
BACK PAIN: 1

## 2024-01-10 ASSESSMENT — ANXIETY QUESTIONNAIRES
6. BECOMING EASILY ANNOYED OR IRRITABLE: 3
IF YOU CHECKED OFF ANY PROBLEMS ON THIS QUESTIONNAIRE, HOW DIFFICULT HAVE THESE PROBLEMS MADE IT FOR YOU TO DO YOUR WORK, TAKE CARE OF THINGS AT HOME, OR GET ALONG WITH OTHER PEOPLE: SOMEWHAT DIFFICULT
5. BEING SO RESTLESS THAT IT IS HARD TO SIT STILL: 0
4. TROUBLE RELAXING: 3
7. FEELING AFRAID AS IF SOMETHING AWFUL MIGHT HAPPEN: 0
GAD7 TOTAL SCORE: 9
3. WORRYING TOO MUCH ABOUT DIFFERENT THINGS: 0
2. NOT BEING ABLE TO STOP OR CONTROL WORRYING: 0
1. FEELING NERVOUS, ANXIOUS, OR ON EDGE: 3

## 2024-01-10 ASSESSMENT — PATIENT HEALTH QUESTIONNAIRE - PHQ9
9. THOUGHTS THAT YOU WOULD BE BETTER OFF DEAD, OR OF HURTING YOURSELF: 0
SUM OF ALL RESPONSES TO PHQ QUESTIONS 1-9: 9
SUM OF ALL RESPONSES TO PHQ9 QUESTIONS 1 & 2: 0
10. IF YOU CHECKED OFF ANY PROBLEMS, HOW DIFFICULT HAVE THESE PROBLEMS MADE IT FOR YOU TO DO YOUR WORK, TAKE CARE OF THINGS AT HOME, OR GET ALONG WITH OTHER PEOPLE: 1
SUM OF ALL RESPONSES TO PHQ QUESTIONS 1-9: 9
SUM OF ALL RESPONSES TO PHQ QUESTIONS 1-9: 9
6. FEELING BAD ABOUT YOURSELF - OR THAT YOU ARE A FAILURE OR HAVE LET YOURSELF OR YOUR FAMILY DOWN: 0
3. TROUBLE FALLING OR STAYING ASLEEP: 3
7. TROUBLE CONCENTRATING ON THINGS, SUCH AS READING THE NEWSPAPER OR WATCHING TELEVISION: 0
8. MOVING OR SPEAKING SO SLOWLY THAT OTHER PEOPLE COULD HAVE NOTICED. OR THE OPPOSITE, BEING SO FIGETY OR RESTLESS THAT YOU HAVE BEEN MOVING AROUND A LOT MORE THAN USUAL: 3
4. FEELING TIRED OR HAVING LITTLE ENERGY: 0
1. LITTLE INTEREST OR PLEASURE IN DOING THINGS: 0
5. POOR APPETITE OR OVEREATING: 3
SUM OF ALL RESPONSES TO PHQ QUESTIONS 1-9: 9
2. FEELING DOWN, DEPRESSED OR HOPELESS: 0
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
## 2024-01-11 ENCOUNTER — TREATMENT (OUTPATIENT)
Age: 59
End: 2024-01-11

## 2024-01-11 DIAGNOSIS — Z78.9 DECREASED ACTIVITIES OF DAILY LIVING (ADL): ICD-10-CM

## 2024-01-11 DIAGNOSIS — R29.898 DECREASED GRIP STRENGTH: ICD-10-CM

## 2024-01-11 DIAGNOSIS — M25.641 STIFFNESS OF FINGER JOINT OF RIGHT HAND: ICD-10-CM

## 2024-01-11 DIAGNOSIS — M25.60 DECREASED RANGE OF MOTION: Primary | ICD-10-CM

## 2024-01-11 NOTE — PROGRESS NOTES
[facility] OT [facility]  [address] [place] [id number]  Dept: [phone]      Occupational Therapy Daily Note      Referring MD: Beto Jimenez MD    Diagnosis:     ICD-10-CM    1. Decreased range of motion  M25.60       2. Decreased activities of daily living (ADL)  Z78.9       3. Stiffness of finger joint of right hand  M25.641       4. Decreased  strength  R29.898           Surgery: Palmar fasciectomy of the right long finger   Date 11/27/23     Therapy precautions: None; no stress/strain     History of injury/onset : Pt has had h/o Dupuytrens to the middle finger for several years.     Total Direct Treatment Time: 43 min                       Total In Office Time: 53 min  Modifier needed: No  Episode visit count:  13     Preferred Name:  Ene     PERTINENT MEDICAL HISTORY     PMHX & Meds:   Past Medical History:   Diagnosis Date    Arthritis     OA- generalized     Cervical spondylosis with radiculopathy 12/13/2016    Neck surgery - Dr. Lemon.    Chondromalacia of left patellofemoral joint 3/11/2016    Chondromalacia of patella 6/18/2015    Chronic pain     neck and R arm since 2013    MMT (medial meniscus tear)     right    Obesity (BMI 30-39.9) 11/30/2016    BMI 38    Plica syndrome 6/18/2015    Shoulder pain, right     and right hip pain    Tear of lateral cartilage or meniscus of knee, current 6/18/2015    Tear of lateral meniscus of left knee, current 3/11/2016    Tear of medial meniscus of left knee, current 3/11/2016    Varicose veins 6/24/2013    Venous insufficiency (chronic) (peripheral) 6/24/2013   ,   Past Surgical History:   Procedure Laterality Date    BREAST SURGERY      CERVICAL DISCECTOMY  1999    C5- bone graft    HAND SURGERY Right 11/27/2023    PALMAR FASCIECTOMY of the right long finger performed by Beto Jimenez MD at [facility]    HEENT      nasal polyps     HYSTERECTOMY (CERVIX STATUS UNKNOWN)  1998    IMPLANT BREAST SILICONE/EQ Bilateral 1995    KNEE ARTHROSCOPY

## 2024-01-16 ENCOUNTER — TREATMENT (OUTPATIENT)
Age: 59
End: 2024-01-16
Payer: COMMERCIAL

## 2024-01-16 DIAGNOSIS — M25.641 STIFFNESS OF FINGER JOINT OF RIGHT HAND: ICD-10-CM

## 2024-01-16 DIAGNOSIS — R29.898 DECREASED GRIP STRENGTH: ICD-10-CM

## 2024-01-16 DIAGNOSIS — M25.60 DECREASED RANGE OF MOTION: Primary | ICD-10-CM

## 2024-01-16 DIAGNOSIS — Z78.9 DECREASED ACTIVITIES OF DAILY LIVING (ADL): ICD-10-CM

## 2024-01-16 PROCEDURE — 97018 PARAFFIN BATH THERAPY: CPT

## 2024-01-16 PROCEDURE — 97110 THERAPEUTIC EXERCISES: CPT

## 2024-01-17 ENCOUNTER — OFFICE VISIT (OUTPATIENT)
Dept: ORTHOPEDIC SURGERY | Age: 59
End: 2024-01-17
Payer: COMMERCIAL

## 2024-01-17 VITALS — HEIGHT: 71 IN | WEIGHT: 262 LBS | BODY MASS INDEX: 36.68 KG/M2

## 2024-01-17 DIAGNOSIS — M47.816 LUMBAR SPONDYLOSIS: Primary | ICD-10-CM

## 2024-01-17 DIAGNOSIS — M54.16 LUMBAR RADICULOPATHY: ICD-10-CM

## 2024-01-17 PROCEDURE — 99204 OFFICE O/P NEW MOD 45 MIN: CPT | Performed by: PHYSICIAN ASSISTANT
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
## 2024-01-18 NOTE — PROGRESS NOTES
[facility] OT [facility]  [address] [place] [id number]  Dept: [phone]      Occupational Therapy Daily Note      Referring MD: Beto Jimenez MD    Diagnosis:     ICD-10-CM    1. Decreased range of motion  M25.60       2. Decreased activities of daily living (ADL)  Z78.9       3. Stiffness of finger joint of right hand  M25.641       4. Decreased  strength  R29.898           Surgery: Palmar fasciectomy of the right long finger   Date 11/27/23     Therapy precautions: None; no stress/strain     History of injury/onset : Pt has had h/o Dupuytrens to the middle finger for several years.     Total Direct Treatment Time: 40 min                       Total In Office Time: 50 min  Modifier needed: No  Episode visit count:  14     Preferred Name:  Ene     PERTINENT MEDICAL HISTORY     PMHX & Meds:   Past Medical History:   Diagnosis Date    Arthritis     OA- generalized     Cervical spondylosis with radiculopathy 12/13/2016    Neck surgery - Dr. Lemon.    Chondromalacia of left patellofemoral joint 3/11/2016    Chondromalacia of patella 6/18/2015    Chronic pain     neck and R arm since 2013    MMT (medial meniscus tear)     right    Obesity (BMI 30-39.9) 11/30/2016    BMI 38    Plica syndrome 6/18/2015    Shoulder pain, right     and right hip pain    Tear of lateral cartilage or meniscus of knee, current 6/18/2015    Tear of lateral meniscus of left knee, current 3/11/2016    Tear of medial meniscus of left knee, current 3/11/2016    Varicose veins 6/24/2013    Venous insufficiency (chronic) (peripheral) 6/24/2013   ,   Past Surgical History:   Procedure Laterality Date    BREAST SURGERY      CERVICAL DISCECTOMY  1999    C5- bone graft    HAND SURGERY Right 11/27/2023    PALMAR FASCIECTOMY of the right long finger performed by Beto Jimenez MD at [facility]    HEENT      nasal polyps     HYSTERECTOMY (CERVIX STATUS UNKNOWN)  1998    IMPLANT BREAST SILICONE/EQ Bilateral 1995    KNEE

## 2024-01-19 ENCOUNTER — TREATMENT (OUTPATIENT)
Age: 59
End: 2024-01-19
Payer: COMMERCIAL

## 2024-01-19 DIAGNOSIS — M25.60 DECREASED RANGE OF MOTION: Primary | ICD-10-CM

## 2024-01-19 DIAGNOSIS — R29.898 DECREASED GRIP STRENGTH: ICD-10-CM

## 2024-01-19 DIAGNOSIS — Z78.9 DECREASED ACTIVITIES OF DAILY LIVING (ADL): ICD-10-CM

## 2024-01-19 DIAGNOSIS — M25.641 STIFFNESS OF FINGER JOINT OF RIGHT HAND: ICD-10-CM

## 2024-01-19 PROCEDURE — 97035 APP MDLTY 1+ULTRASOUND EA 15: CPT

## 2024-01-19 PROCEDURE — 97110 THERAPEUTIC EXERCISES: CPT

## 2024-01-19 PROCEDURE — 97018 PARAFFIN BATH THERAPY: CPT

## 2024-01-19 NOTE — PROGRESS NOTES
ARTHROSCOPY Right 2015    KNEE ARTHROSCOPY Left 2016    ORTHOPEDIC SURGERY Right     foot surgery    TONSILLECTOMY  as child      Medications. : Reviewed in chart  Allergies:   Allergies   Allergen Reactions    Flexeril [Cyclobenzaprine] Other (See Comments)     Urinary retention    Adhesive Tape Rash     \"Peels my skin off\"    Hydrocodone-Acetaminophen Itching and Rash        SUBJECTIVE     Reports that she is feeling increasing finger motion.   OBJECTIVE       Digital AROM:  IE IF LF RF SF LF  12/8/23   AROM    MCP NT°  0/25  45°  NT°  85   AROM      PIP NT°  -15/50 70°  NT°  84   AROM      DIP NT°  0/ 35°  NT°  42       Digital AROM:    LF AROM POST TX 12/28/23  LF AROM Post Tx 1/9/24     AROM    MCP  0/83 0/80     AROM      PIP  -20/88 -15/86     AROM      DIP  0/45 /52              Ultrasound (97308) x 8 minutes to volar R MF, at 3 MHz, 1.0 w/cm2  continuous with gel assisting in reducing pain, muscle spasm/soft tissue restrictions.      Paraffin Dip to  right hand, (93084) 10 minutes, to increase tissue extensibility/decrease pain  to prepare for treatment.Therapeutic exercise (54167) x 45 min:  Scar massage  AROM composite IP flexion/extension 10 x 2  AROM composite digit flexion 10 x 2  AROM reverse blocking 10 x 2  AROM tendon gliding exercises  Active isolated digit extension  Place/hold digit flexion  PIP blocking   DIP blocking   Rice bin for grasp and release  A/AROM reverse blocking 10 x 2  PROM PIP extension  PROM  PIP flexion  PROM  composite digit flexion  Place/hold digit flexion  Wrist blocker for exercise purposes to promote proper composite digit flexion  Adjusted PIP ext splint  Grasp and release tasks with wrist block on - sponge squares    Access Code: [email or web address]  URL: [email or web address]  Date: 12/08/2023  Prepared by: Alannah Tyler    Exercises  - Hand AROM Reverse Blocking  - 4-5 x daily - 7 x weekly - 1 sets - 25 reps    ASSESSMENT     The patient is s/p left long finger

## 2024-01-23 ENCOUNTER — TREATMENT (OUTPATIENT)
Age: 59
End: 2024-01-23
Payer: COMMERCIAL

## 2024-01-23 DIAGNOSIS — M25.60 DECREASED RANGE OF MOTION: Primary | ICD-10-CM

## 2024-01-23 DIAGNOSIS — R29.898 DECREASED GRIP STRENGTH: ICD-10-CM

## 2024-01-23 DIAGNOSIS — M25.641 STIFFNESS OF FINGER JOINT OF RIGHT HAND: ICD-10-CM

## 2024-01-23 DIAGNOSIS — Z78.9 DECREASED ACTIVITIES OF DAILY LIVING (ADL): ICD-10-CM

## 2024-01-23 PROCEDURE — 97110 THERAPEUTIC EXERCISES: CPT

## 2024-01-23 PROCEDURE — 97032 APPL MODALITY 1+ESTIM EA 15: CPT

## 2024-01-23 PROCEDURE — 97035 APP MDLTY 1+ULTRASOUND EA 15: CPT

## 2024-01-23 PROCEDURE — 97018 PARAFFIN BATH THERAPY: CPT

## 2024-01-23 NOTE — PROGRESS NOTES
[facility] OT [facility]  [address] [place] [id number]  Dept: [phone]      Occupational Therapy Daily Note      Referring MD: Beto Jimenez MD    Diagnosis:     ICD-10-CM    1. Decreased range of motion  M25.60       2. Decreased  strength  R29.898       3. Decreased activities of daily living (ADL)  Z78.9       4. Stiffness of finger joint of right hand  M25.641           Surgery: Palmar fasciectomy of the right long finger   Date 11/27/23     Therapy precautions: None; no stress/strain     History of injury/onset : Pt has had h/o Dupuytrens to the middle finger for several years.     Total Direct Treatment Time: 53 min                       Total In Office Time: 63 min  Modifier needed: No  Episode visit count:  16     Preferred Name:  Ene     PERTINENT MEDICAL HISTORY     PMHX & Meds:   Past Medical History:   Diagnosis Date    Arthritis     OA- generalized     Cervical spondylosis with radiculopathy 12/13/2016    Neck surgery - Dr. Lemon.    Chondromalacia of left patellofemoral joint 3/11/2016    Chondromalacia of patella 6/18/2015    Chronic pain     neck and R arm since 2013    MMT (medial meniscus tear)     right    Obesity (BMI 30-39.9) 11/30/2016    BMI 38    Plica syndrome 6/18/2015    Shoulder pain, right     and right hip pain    Tear of lateral cartilage or meniscus of knee, current 6/18/2015    Tear of lateral meniscus of left knee, current 3/11/2016    Tear of medial meniscus of left knee, current 3/11/2016    Varicose veins 6/24/2013    Venous insufficiency (chronic) (peripheral) 6/24/2013   ,   Past Surgical History:   Procedure Laterality Date    BREAST SURGERY      CERVICAL DISCECTOMY  1999    C5- bone graft    HAND SURGERY Right 11/27/2023    PALMAR FASCIECTOMY of the right long finger performed by Beto Jimenez MD at [facility]    HEENT      nasal polyps     HYSTERECTOMY (CERVIX STATUS UNKNOWN)  1998    IMPLANT BREAST SILICONE/EQ Bilateral 1995    KNEE

## 2024-01-24 ENCOUNTER — HOSPITAL ENCOUNTER (OUTPATIENT)
Dept: MRI IMAGING | Age: 59
Discharge: HOME | End: 2024-01-27

## 2024-01-24 DIAGNOSIS — M47.816 LUMBAR SPONDYLOSIS: ICD-10-CM

## 2024-01-26 ENCOUNTER — TREATMENT (OUTPATIENT)
Age: 59
End: 2024-01-26
Payer: COMMERCIAL

## 2024-01-26 DIAGNOSIS — Z78.9 DECREASED ACTIVITIES OF DAILY LIVING (ADL): ICD-10-CM

## 2024-01-26 DIAGNOSIS — M25.641 STIFFNESS OF FINGER JOINT OF RIGHT HAND: ICD-10-CM

## 2024-01-26 DIAGNOSIS — M25.60 DECREASED RANGE OF MOTION: Primary | ICD-10-CM

## 2024-01-26 DIAGNOSIS — R29.898 DECREASED GRIP STRENGTH: ICD-10-CM

## 2024-01-26 PROCEDURE — 97018 PARAFFIN BATH THERAPY: CPT

## 2024-01-26 PROCEDURE — 97035 APP MDLTY 1+ULTRASOUND EA 15: CPT

## 2024-01-26 PROCEDURE — 97032 APPL MODALITY 1+ESTIM EA 15: CPT

## 2024-01-26 PROCEDURE — 97110 THERAPEUTIC EXERCISES: CPT

## 2024-01-29 NOTE — PROGRESS NOTES
[facility] OT [facility]  [address] [place] [address]  Dept: [phone]      Occupational Therapy Daily Note      Referring MD: Beto Jimenez MD    Diagnosis:     ICD-10-CM    1. Decreased range of motion  M25.60       2. Decreased  strength  R29.898       3. Decreased activities of daily living (ADL)  Z78.9       4. Stiffness of finger joint of right hand  M25.641           Surgery: Palmar fasciectomy of the right long finger   Date 11/27/23     Therapy precautions: None; no stress/strain     History of injury/onset : Pt has had h/o Dupuytrens to the middle finger for several years.     Total Direct Treatment Time: 58 min                       Total In Office Time: 68 min  Modifier needed: No  Episode visit count:  17     Preferred Name:  Ene     PERTINENT MEDICAL HISTORY     PMHX & Meds:   Past Medical History:   Diagnosis Date    Arthritis     OA- generalized     Cervical spondylosis with radiculopathy 12/13/2016    Neck surgery - Dr. Lemon.    Chondromalacia of left patellofemoral joint 3/11/2016    Chondromalacia of patella 6/18/2015    Chronic pain     neck and R arm since 2013    MMT (medial meniscus tear)     right    Obesity (BMI 30-39.9) 11/30/2016    BMI 38    Plica syndrome 6/18/2015    Shoulder pain, right     and right hip pain    Tear of lateral cartilage or meniscus of knee, current 6/18/2015    Tear of lateral meniscus of left knee, current 3/11/2016    Tear of medial meniscus of left knee, current 3/11/2016    Varicose veins 6/24/2013    Venous insufficiency (chronic) (peripheral) 6/24/2013   ,   Past Surgical History:   Procedure Laterality Date    BREAST SURGERY      CERVICAL DISCECTOMY  1999    C5- bone graft    HAND SURGERY Right 11/27/2023    PALMAR FASCIECTOMY of the right long finger performed by Beto Jimenez MD at [facility]    HEENT      nasal polyps     HYSTERECTOMY (CERVIX STATUS UNKNOWN)  1998    IMPLANT BREAST SILICONE/EQ Bilateral 1995    KNEE

## 2024-02-05 ENCOUNTER — TREATMENT (OUTPATIENT)
Age: 59
End: 2024-02-05
Payer: COMMERCIAL

## 2024-02-05 DIAGNOSIS — M79.644 FINGER PAIN, RIGHT: ICD-10-CM

## 2024-02-05 DIAGNOSIS — R29.898 DECREASED GRIP STRENGTH: ICD-10-CM

## 2024-02-05 DIAGNOSIS — Z78.9 DECREASED ACTIVITIES OF DAILY LIVING (ADL): ICD-10-CM

## 2024-02-05 DIAGNOSIS — M25.60 DECREASED RANGE OF MOTION: Primary | ICD-10-CM

## 2024-02-05 DIAGNOSIS — M79.89 SWELLING OF FINGER, RIGHT: ICD-10-CM

## 2024-02-05 DIAGNOSIS — M25.641 STIFFNESS OF FINGER JOINT OF RIGHT HAND: ICD-10-CM

## 2024-02-05 PROCEDURE — 97018 PARAFFIN BATH THERAPY: CPT

## 2024-02-05 PROCEDURE — 97110 THERAPEUTIC EXERCISES: CPT

## 2024-02-05 NOTE — PROGRESS NOTES
STATUS UNKNOWN)  1998    IMPLANT BREAST SILICONE/EQ Bilateral 1995    KNEE ARTHROSCOPY Right 2015    KNEE ARTHROSCOPY Left 2016    ORTHOPEDIC SURGERY Right     foot surgery    TONSILLECTOMY  as child      Medications. : Reviewed in chart  Allergies:   Allergies   Allergen Reactions    Flexeril [Cyclobenzaprine] Other (See Comments)     Urinary retention    Adhesive Tape Rash     \"Peels my skin off\"    Hydrocodone-Acetaminophen Itching and Rash        SUBJECTIVE     Reports that she is feeling increasing finger motion.   OBJECTIVE     Functional Outcome Measures: Quick Dash  25 score=   31.8% functional deficit    Digital AROM:  IE IF LF RF SF LF  12/8/23   AROM    MCP NT°  0/25  45°  NT°  85   AROM      PIP NT°  -15/50 70°  NT°  84   AROM      DIP NT°  0/ 35°  NT°  42       Digital AROM:    LF AROM POST TX 12/28/23  LF AROM Post Tx 1/9/24 LF AROM  Pre  2/5/24 LF AROM  Post  2/5/24   AROM    MCP  0/83 0/80 0/78 0/80   AROM      PIP  -20/88 -15/86 -30/80 -20/90   AROM      DIP  0/45 /52 0/50 0/55             Paraffin Dip to  right hand, (33640) 10 minutes, to increase tissue extensibility/decrease pain  to prepare for treatment.Therapeutic exercise (53667) x 40 min:  STM/Scar massage  Active isolated digit extension  Place/hold digit flexion  PIP blocking   DIP blocking   Rice bin for grasp and release  A/AROM reverse blocking 10 x 2  PROM PIP extension  PROM  PIP flexion  PROM  composite digit flexion  Place/hold digit flexion  Modified PIP extension splint to increase extension    strengthening with yellow putty and declining dowels  Composite  strengthening with yellow putty      Access Code: [email or web address]  URL: [email or web address]  Date: 12/08/2023  Prepared by: Alannah Tyler    Exercises  - Hand AROM Reverse Blocking  - 4-5 x daily - 7 x weekly - 1 sets - 25 reps    ASSESSMENT     The patient is s/p left long finger partial fasciectomy.  The patient is demonstrating increasing composite flexion

## 2024-02-08 ENCOUNTER — HOSPITAL ENCOUNTER (OUTPATIENT)
Dept: MAMMOGRAPHY | Age: 59
Discharge: HOME | End: 2024-02-08
Payer: COMMERCIAL

## 2024-02-08 ENCOUNTER — HOSPITAL ENCOUNTER (OUTPATIENT)
Dept: MAMMOGRAPHY | Age: 59
End: 2024-02-08
Payer: COMMERCIAL

## 2024-02-08 VITALS — BODY MASS INDEX: 34.87 KG/M2 | WEIGHT: 250 LBS

## 2024-02-08 DIAGNOSIS — N64.4 PAIN OF RIGHT BREAST: ICD-10-CM

## 2024-02-08 DIAGNOSIS — Z98.82 PRESENCE OF BILATERAL BREAST IMPLANT: ICD-10-CM

## 2024-02-08 PROCEDURE — 77066 DX MAMMO INCL CAD BI: CPT

## 2024-02-08 PROCEDURE — 76642 ULTRASOUND BREAST LIMITED: CPT

## 2024-02-09 ENCOUNTER — OFFICE VISIT (OUTPATIENT)
Dept: ORTHOPEDIC SURGERY | Age: 59
End: 2024-02-09

## 2024-02-09 ENCOUNTER — TREATMENT (OUTPATIENT)
Age: 59
End: 2024-02-09

## 2024-02-09 DIAGNOSIS — M25.60 DECREASED RANGE OF MOTION: Primary | ICD-10-CM

## 2024-02-09 DIAGNOSIS — M72.0 DUPUYTREN'S CONTRACTURE OF RIGHT HAND: Primary | ICD-10-CM

## 2024-02-09 DIAGNOSIS — M25.641 STIFFNESS OF FINGER JOINT OF RIGHT HAND: ICD-10-CM

## 2024-02-09 DIAGNOSIS — R29.898 DECREASED GRIP STRENGTH: ICD-10-CM

## 2024-02-09 DIAGNOSIS — Z78.9 DECREASED ACTIVITIES OF DAILY LIVING (ADL): ICD-10-CM

## 2024-02-09 PROCEDURE — 99024 POSTOP FOLLOW-UP VISIT: CPT | Performed by: ORTHOPAEDIC SURGERY

## 2024-02-09 NOTE — PROGRESS NOTES
[facility] OT [facility]  [address] [place] [phone]  Dept: [phone]      Occupational Therapy Daily Note      Referring MD: Beto Jimenez MD    Diagnosis:     ICD-10-CM    1. Decreased range of motion  M25.60       2. Decreased  strength  R29.898       3. Decreased activities of daily living (ADL)  Z78.9       4. Stiffness of finger joint of right hand  M25.641             Surgery: Palmar fasciectomy of the right long finger   Date 11/27/23     Therapy precautions: None; no stress/strain     History of injury/onset : Pt has had h/o Dupuytrens to the middle finger for several years.     Total Direct Treatment Time: 40 min                       Total In Office Time: 50 min  Modifier needed: No  Episode visit count:  19     Preferred Name:  Ene     PERTINENT MEDICAL HISTORY     PMHX & Meds:   Past Medical History:   Diagnosis Date    Arthritis     OA- generalized     Cervical spondylosis with radiculopathy 12/13/2016    Neck surgery - Dr. Lemon.    Chondromalacia of left patellofemoral joint 3/11/2016    Chondromalacia of patella 6/18/2015    Chronic pain     neck and R arm since 2013    MMT (medial meniscus tear)     right    Obesity (BMI 30-39.9) 11/30/2016    BMI 38    Plica syndrome 6/18/2015    Shoulder pain, right     and right hip pain    Tear of lateral cartilage or meniscus of knee, current 6/18/2015    Tear of lateral meniscus of left knee, current 3/11/2016    Tear of medial meniscus of left knee, current 3/11/2016    Varicose veins 6/24/2013    Venous insufficiency (chronic) (peripheral) 6/24/2013   ,   Past Surgical History:   Procedure Laterality Date    BREAST SURGERY      CERVICAL DISCECTOMY  1999    C5- bone graft    HAND SURGERY Right 11/27/2023    PALMAR FASCIECTOMY of the right long finger performed by Beto Jimenez MD at [facility]    HEENT      nasal polyps     HYSTERECTOMY (CERVIX STATUS UNKNOWN)  1998    IMPLANT BREAST SILICONE/EQ Bilateral 1995    KNEE

## 2024-02-09 NOTE — PROGRESS NOTES
Orthopaedic Hand Surgery Note    Name: Ene Angel  Age: 59 y.o.  YOB: 1965  Gender: female  MRN: [id number]    Post Operative Visit: PALMAR FASCIECTOMY of the right long finger - Right    HPI: Patient is status post PALMAR FASCIECTOMY of the right long finger - Right on 11/27/2023. Patient reports ongoing pain around the scar and some residual contracture.    Physical Examination:  Well-healed surgical wounds. Sensation is intact in all fingers. Motor exam reveals no deficits.  She can make a full fist, she has some thickened scar along the surgical area, this is moderately tender, she also has some tenderness palpation of the DIP joint.    Imaging:     non continue therapy for scar mobilization, motion as tolerated,e    Assessment:   1. Dupuytren's contracture of right hand         Status post PALMAR FASCIECTOMY of the right long finger - Right on 11/27/2023    Plan:  We discussed the post operative course and progression.  More than happy to reassess the patient, she is having some DIP pain which could be related to arthritis, we prescribed steroids before which provided some relief, we could always reassess and perform DIP joint steroid injections    Beto Jimenez MD  02/09/24  11:59 AM

## 2024-02-12 ENCOUNTER — TREATMENT (OUTPATIENT)
Age: 59
End: 2024-02-12
Payer: COMMERCIAL

## 2024-02-12 DIAGNOSIS — M25.60 DECREASED RANGE OF MOTION: Primary | ICD-10-CM

## 2024-02-12 DIAGNOSIS — M79.644 FINGER PAIN, RIGHT: ICD-10-CM

## 2024-02-12 DIAGNOSIS — M25.641 STIFFNESS OF FINGER JOINT OF RIGHT HAND: ICD-10-CM

## 2024-02-12 DIAGNOSIS — Z78.9 DECREASED ACTIVITIES OF DAILY LIVING (ADL): ICD-10-CM

## 2024-02-12 DIAGNOSIS — M79.641 PAIN OF RIGHT HAND: ICD-10-CM

## 2024-02-12 DIAGNOSIS — R29.898 DECREASED GRIP STRENGTH: ICD-10-CM

## 2024-02-12 PROCEDURE — 97110 THERAPEUTIC EXERCISES: CPT | Performed by: OCCUPATIONAL THERAPIST

## 2024-02-12 PROCEDURE — 97018 PARAFFIN BATH THERAPY: CPT | Performed by: OCCUPATIONAL THERAPIST

## 2024-02-12 NOTE — PROGRESS NOTES
[facility] ORTHOPAEDICS  [address] [place] [address]  Dept: [phone]      Occupational Therapy Daily Note      Referring MD: Beto Jimenez MD    Diagnosis:     ICD-10-CM    1. Decreased range of motion  M25.60       2. Decreased  strength  R29.898       3. Decreased activities of daily living (ADL)  Z78.9       4. Stiffness of finger joint of right hand  M25.641       5. Finger pain, right  M79.644       6. Pain of right hand  M79.641               Surgery: Palmar fasciectomy of the right long finger   Date 11/27/23     Therapy precautions: None; no stress/strain     History of injury/onset : Pt has had h/o Dupuytrens to the middle finger for several years.     Total Direct Treatment Time: 35 min                       Total In Office Time: 45 min  Modifier needed: No  Episode visit count:  20     Preferred Name:  Ene     PERTINENT MEDICAL HISTORY     PMHX & Meds:   Past Medical History:   Diagnosis Date    Arthritis     OA- generalized     Cervical spondylosis with radiculopathy 12/13/2016    Neck surgery - Dr. Lemon.    Chondromalacia of left patellofemoral joint 3/11/2016    Chondromalacia of patella 6/18/2015    Chronic pain     neck and R arm since 2013    MMT (medial meniscus tear)     right    Obesity (BMI 30-39.9) 11/30/2016    BMI 38    Plica syndrome 6/18/2015    Shoulder pain, right     and right hip pain    Tear of lateral cartilage or meniscus of knee, current 6/18/2015    Tear of lateral meniscus of left knee, current 3/11/2016    Tear of medial meniscus of left knee, current 3/11/2016    Varicose veins 6/24/2013    Venous insufficiency (chronic) (peripheral) 6/24/2013   ,   Past Surgical History:   Procedure Laterality Date    BREAST SURGERY      CERVICAL DISCECTOMY  1999    C5- bone graft    HAND SURGERY Right 11/27/2023    PALMAR FASCIECTOMY of the right long finger performed by Beto Jimenez MD at [facility]    HEENT      nasal polyps     HYSTERECTOMY (CERVIX STATUS

## 2024-02-16 ENCOUNTER — TREATMENT (OUTPATIENT)
Age: 59
End: 2024-02-16

## 2024-02-16 DIAGNOSIS — M25.60 DECREASED RANGE OF MOTION: Primary | ICD-10-CM

## 2024-02-16 DIAGNOSIS — Z78.9 DECREASED ACTIVITIES OF DAILY LIVING (ADL): ICD-10-CM

## 2024-02-16 DIAGNOSIS — M25.641 STIFFNESS OF FINGER JOINT OF RIGHT HAND: ICD-10-CM

## 2024-02-16 DIAGNOSIS — R29.898 DECREASED GRIP STRENGTH: ICD-10-CM

## 2024-02-16 NOTE — PROGRESS NOTES
[facility] ORTHOPAED[facility]  [address] [place] [id number]  Dept: [phone]      Occupational Therapy Daily Note      Referring MD: Beto Jimenez MD    Diagnosis:     ICD-10-CM    1. Decreased range of motion  M25.60       2. Decreased  strength  R29.898       3. Decreased activities of daily living (ADL)  Z78.9       4. Stiffness of finger joint of right hand  M25.641               Surgery: Palmar fasciectomy of the right long finger   Date 11/27/23     Therapy precautions: None; no stress/strain     History of injury/onset : Pt has had h/o Dupuytrens to the middle finger for several years.     Total Direct Treatment Time: 35 min                       Total In Office Time: 45 min  Modifier needed: No  Episode visit count:  21     Preferred Name:  Ene     PERTINENT MEDICAL HISTORY     PMHX & Meds:   Past Medical History:   Diagnosis Date    Arthritis     OA- generalized     Cervical spondylosis with radiculopathy 12/13/2016    Neck surgery - Dr. Lemon.    Chondromalacia of left patellofemoral joint 3/11/2016    Chondromalacia of patella 6/18/2015    Chronic pain     neck and R arm since 2013    MMT (medial meniscus tear)     right    Obesity (BMI 30-39.9) 11/30/2016    BMI 38    Plica syndrome 6/18/2015    Shoulder pain, right     and right hip pain    Tear of lateral cartilage or meniscus of knee, current 6/18/2015    Tear of lateral meniscus of left knee, current 3/11/2016    Tear of medial meniscus of left knee, current 3/11/2016    Varicose veins 6/24/2013    Venous insufficiency (chronic) (peripheral) 6/24/2013   ,   Past Surgical History:   Procedure Laterality Date    BREAST SURGERY      CERVICAL DISCECTOMY  1999    C5- bone graft    HAND SURGERY Right 11/27/2023    PALMAR FASCIECTOMY of the right long finger performed by Beto Jimenez MD at [facility]    HEENT      nasal polyps     HYSTERECTOMY (CERVIX STATUS UNKNOWN)  1998    IMPLANT BREAST SILICONE/EQ Bilateral 1995    KNEE

## 2024-02-19 ENCOUNTER — TREATMENT (OUTPATIENT)
Age: 59
End: 2024-02-19
Payer: COMMERCIAL

## 2024-02-19 DIAGNOSIS — Z78.9 DECREASED ACTIVITIES OF DAILY LIVING (ADL): ICD-10-CM

## 2024-02-19 DIAGNOSIS — R29.898 DECREASED GRIP STRENGTH: ICD-10-CM

## 2024-02-19 DIAGNOSIS — M79.644 FINGER PAIN, RIGHT: ICD-10-CM

## 2024-02-19 DIAGNOSIS — M25.641 STIFFNESS OF FINGER JOINT OF RIGHT HAND: ICD-10-CM

## 2024-02-19 DIAGNOSIS — M25.60 DECREASED RANGE OF MOTION: Primary | ICD-10-CM

## 2024-02-19 PROCEDURE — 97018 PARAFFIN BATH THERAPY: CPT

## 2024-02-19 PROCEDURE — 97110 THERAPEUTIC EXERCISES: CPT

## 2024-02-19 NOTE — PROGRESS NOTES
[facility] ORTHOPAEDICS  [address] [place] [id number]  Dept: [phone]      Occupational Therapy Daily Note      Referring MD: Beto Jimenez MD    Diagnosis:     ICD-10-CM    1. Decreased range of motion  M25.60       2. Decreased  strength  R29.898       3. Decreased activities of daily living (ADL)  Z78.9       4. Stiffness of finger joint of right hand  M25.641       5. Finger pain, right  M79.644               Surgery: Palmar fasciectomy of the right long finger   Date 11/27/23     Therapy precautions: None; no stress/strain     History of injury/onset : Pt has had h/o Dupuytrens to the middle finger for several years.     Total Direct Treatment Time: 40 min                       Total In Office Time: 50 min  Modifier needed: No  Episode visit count:  22     Preferred Name:  Ene     PERTINENT MEDICAL HISTORY     PMHX & Meds:   Past Medical History:   Diagnosis Date    Arthritis     OA- generalized     Cervical spondylosis with radiculopathy 12/13/2016    Neck surgery - Dr. Lemon.    Chondromalacia of left patellofemoral joint 3/11/2016    Chondromalacia of patella 6/18/2015    Chronic pain     neck and R arm since 2013    MMT (medial meniscus tear)     right    Obesity (BMI 30-39.9) 11/30/2016    BMI 38    Plica syndrome 6/18/2015    Shoulder pain, right     and right hip pain    Tear of lateral cartilage or meniscus of knee, current 6/18/2015    Tear of lateral meniscus of left knee, current 3/11/2016    Tear of medial meniscus of left knee, current 3/11/2016    Varicose veins 6/24/2013    Venous insufficiency (chronic) (peripheral) 6/24/2013   ,   Past Surgical History:   Procedure Laterality Date    BREAST SURGERY      CERVICAL DISCECTOMY  1999    C5- bone graft    HAND SURGERY Right 11/27/2023    PALMAR FASCIECTOMY of the right long finger performed by Beto Jimenez MD at [facility]    HEENT      nasal polyps     HYSTERECTOMY (CERVIX STATUS UNKNOWN)  1998    IMPLANT BREAST

## 2024-02-20 ENCOUNTER — OFFICE VISIT (OUTPATIENT)
Dept: ORTHOPEDIC SURGERY | Age: 59
End: 2024-02-20
Payer: COMMERCIAL

## 2024-02-20 VITALS — HEIGHT: 71 IN | WEIGHT: 250 LBS | BODY MASS INDEX: 35 KG/M2

## 2024-02-20 DIAGNOSIS — M47.816 LUMBAR SPONDYLOSIS: Primary | ICD-10-CM

## 2024-02-20 PROCEDURE — 99213 OFFICE O/P EST LOW 20 MIN: CPT | Performed by: PHYSICIAN ASSISTANT

## 2024-02-20 NOTE — PROGRESS NOTES
02/20/24        Name: Ene Angel  YOB: 1965  Gender: female  MRN: [id number]    CC: Follow-up (MRI Results )       HPI: Ene Angel is a 59 y.o. female who returns for Follow-up (MRI Results )         Patient returns the office today for follow-up after lumbar MRI    History was obtained by patient      Meds/PSH/PMH/FH/SH: This information has been reviewed.      ALLERGIES:   Allergies   Allergen Reactions    Flexeril [Cyclobenzaprine] Other (See Comments)     Urinary retention    Adhesive Tape Rash     \"Peels my skin off\"    Hydrocodone-Acetaminophen Itching and Rash              Physical Examination:            Imaging:         MRI Result (most recent):  MRI LUMBAR SPINE WO CONTRAST 01/24/2024    Narrative  History: Worsening low back pain exam: MRI lumbar spine without contrast    Technique: Axial and sagittal T1 and T2-weighted sequences are available for  review.  A sagittal STIR sequence is also available for review.    COMPARISON:  None    Findings:    The sagittal image demonstrate normal alignment and signal intensity of the  vertebral body without significant decreased disc height. The conus medullaris  is normal. The axial image demonstrate.    L3-L4: Normal    L4-L5: Normal    L5-S1: Moderate hypertrophy of the facet joint is present bilaterally due to  osteoarthritic changes but no significant central canal or neuroforaminal  narrowing.    Impression  No significant central canal or neural foraminal narrowing            Assessment and Plan    I reviewed the MRI report with the patient.  No significant spinal or foraminal stenosis noted.  No suggestion for a source of a lumbar radiculopathy.  No indications for surgical intervention, no indications for interventional treatment with lumbar injection therapy in my opinion.  She does have some mild degenerative changes which were noted and discussed with the patient, they seem age-appropriate.  I recommend continued conservative

## 2024-02-22 ENCOUNTER — TREATMENT (OUTPATIENT)
Age: 59
End: 2024-02-22
Payer: COMMERCIAL

## 2024-02-22 DIAGNOSIS — R29.898 DECREASED GRIP STRENGTH: ICD-10-CM

## 2024-02-22 DIAGNOSIS — M25.60 DECREASED RANGE OF MOTION: Primary | ICD-10-CM

## 2024-02-22 DIAGNOSIS — M25.641 STIFFNESS OF FINGER JOINT OF RIGHT HAND: ICD-10-CM

## 2024-02-22 DIAGNOSIS — Z78.9 DECREASED ACTIVITIES OF DAILY LIVING (ADL): ICD-10-CM

## 2024-02-22 PROCEDURE — 97018 PARAFFIN BATH THERAPY: CPT

## 2024-02-22 PROCEDURE — 97110 THERAPEUTIC EXERCISES: CPT

## 2024-02-23 NOTE — PROGRESS NOTES
[facility] ORTHOPA[facility]  [address] [place] [id number]  Dept: [phone]      Occupational Therapy Daily Note      Referring MD: Beto Jimenez MD    Diagnosis:     ICD-10-CM    1. Decreased range of motion  M25.60       2. Decreased  strength  R29.898       3. Decreased activities of daily living (ADL)  Z78.9       4. Stiffness of finger joint of right hand  M25.641               Surgery: Palmar fasciectomy of the right long finger   Date 11/27/23     Therapy precautions: None; no stress/strain     History of injury/onset : Pt has had h/o Dupuytrens to the middle finger for several years.     Total Direct Treatment Time: 30 min                       Total In Office Time: 40 min  Modifier needed: No  Episode visit count:  23     Preferred Name:  Ene     PERTINENT MEDICAL HISTORY     PMHX & Meds:   Past Medical History:   Diagnosis Date    Arthritis     OA- generalized     Cervical spondylosis with radiculopathy 12/13/2016    Neck surgery - Dr. Lemon.    Chondromalacia of left patellofemoral joint 3/11/2016    Chondromalacia of patella 6/18/2015    Chronic pain     neck and R arm since 2013    MMT (medial meniscus tear)     right    Obesity (BMI 30-39.9) 11/30/2016    BMI 38    Plica syndrome 6/18/2015    Shoulder pain, right     and right hip pain    Tear of lateral cartilage or meniscus of knee, current 6/18/2015    Tear of lateral meniscus of left knee, current 3/11/2016    Tear of medial meniscus of left knee, current 3/11/2016    Varicose veins 6/24/2013    Venous insufficiency (chronic) (peripheral) 6/24/2013   ,   Past Surgical History:   Procedure Laterality Date    BREAST SURGERY      CERVICAL DISCECTOMY  1999    C5- bone graft    HAND SURGERY Right 11/27/2023    PALMAR FASCIECTOMY of the right long finger performed by Beto Jimenez MD at [facility]    HEENT      nasal polyps     HYSTERECTOMY (CERVIX STATUS UNKNOWN)  1998    IMPLANT BREAST SILICONE/EQ Bilateral 1995    KNEE

## 2024-03-04 ENCOUNTER — TREATMENT (OUTPATIENT)
Age: 59
End: 2024-03-04
Payer: COMMERCIAL

## 2024-03-04 DIAGNOSIS — Z78.9 DECREASED ACTIVITIES OF DAILY LIVING (ADL): ICD-10-CM

## 2024-03-04 DIAGNOSIS — M25.641 STIFFNESS OF FINGER JOINT OF RIGHT HAND: ICD-10-CM

## 2024-03-04 DIAGNOSIS — R29.898 DECREASED GRIP STRENGTH: Primary | ICD-10-CM

## 2024-03-04 DIAGNOSIS — M25.60 DECREASED RANGE OF MOTION: ICD-10-CM

## 2024-03-04 PROCEDURE — 97018 PARAFFIN BATH THERAPY: CPT

## 2024-03-04 PROCEDURE — 97110 THERAPEUTIC EXERCISES: CPT

## 2024-03-04 NOTE — PROGRESS NOTES
ARTHROSCOPY Right 2015    KNEE ARTHROSCOPY Left 2016    ORTHOPEDIC SURGERY Right     foot surgery    TONSILLECTOMY  as child      Medications. : Reviewed in chart  Allergies:   Allergies   Allergen Reactions    Flexeril [Cyclobenzaprine] Other (See Comments)     Urinary retention    Adhesive Tape Rash     \"Peels my skin off\"    Hydrocodone-Acetaminophen Itching and Rash        SUBJECTIVE     Reports that it is getting difficult to come in for therapy as she is caring for her mother.     OBJECTIVE     Functional Outcome Measures: Quick Dash  16 score=   11.3% functional deficit  Hand/Side Dominance: right handed    Digital AROM:  IE IF LF RF SF LF  12/8/23   AROM    MCP NT°  0/25  45°  NT°  85   AROM      PIP NT°  -15/50 70°  NT°  84   AROM      DIP NT°  0/ 35°  NT°  42       Digital AROM:    LF AROM POST TX 12/28/23  LF AROM Post Tx 1/9/24 LF AROM  Pre  2/5/24 LF AROM  Post  2/5/24 LF AROM  PRE  3/4/24   AROM    MCP  0/83 0/80 0/78 0/80 WNL   AROM      PIP  -20/88 -15/86 -30/80 -20/90 -18/90   AROM      DIP  0/45 /52 0/50 0/55 0/60     Strength  Strength (psi): RIGHT  3/4/24 LEFT  3/4/24      Position II 35 50     Lat Pinch:       2pt pinch:       3pt pinch:             Paraffin Dip to  right hand, (31942) 10 minutes, to increase tissue extensibility/decrease pain  to prepare for treatment.Therapeutic exercise (86419) x 20 min:    RMO fabricated to promote FDP pull through  Upgraded strengthening with yellow/red mixture of putty  Educated on d/c HEP which includes continued strengthening, PIP ext splint, ROM exercises and RMO.      ASSESSMENT     The patient is s/p left long finger partial fasciectomy.  The patient is demonstrating increased ROM since last session. She reports improved UE function.  Increased AROM following treatment and appears to be maintaining increased digit extension. She will be discharged with HEP due to personal situation at home and difficulty attending therapy sessions. She was

## 2024-07-24 ENCOUNTER — OFFICE VISIT (OUTPATIENT)
Dept: FAMILY MEDICINE CLINIC | Facility: CLINIC | Age: 59
End: 2024-07-24
Payer: COMMERCIAL

## 2024-07-24 VITALS
SYSTOLIC BLOOD PRESSURE: 124 MMHG | RESPIRATION RATE: 16 BRPM | WEIGHT: 254 LBS | HEART RATE: 81 BPM | BODY MASS INDEX: 35.56 KG/M2 | OXYGEN SATURATION: 98 % | HEIGHT: 71 IN | TEMPERATURE: 98.1 F | DIASTOLIC BLOOD PRESSURE: 75 MMHG

## 2024-07-24 DIAGNOSIS — F32.1 MODERATE SINGLE CURRENT EPISODE OF MAJOR DEPRESSIVE DISORDER (HCC): Primary | ICD-10-CM

## 2024-07-24 DIAGNOSIS — M79.601 RIGHT ARM PAIN: ICD-10-CM

## 2024-07-24 DIAGNOSIS — G89.29 CHRONIC PAIN OF RIGHT KNEE: ICD-10-CM

## 2024-07-24 DIAGNOSIS — E66.01 SEVERE OBESITY (BMI 35.0-39.9) WITH COMORBIDITY (HCC): ICD-10-CM

## 2024-07-24 DIAGNOSIS — M25.561 CHRONIC PAIN OF RIGHT KNEE: ICD-10-CM

## 2024-07-24 DIAGNOSIS — E11.9 CONTROLLED TYPE 2 DIABETES MELLITUS WITHOUT COMPLICATION, WITHOUT LONG-TERM CURRENT USE OF INSULIN (HCC): ICD-10-CM

## 2024-07-24 DIAGNOSIS — M25.551 RIGHT HIP PAIN: ICD-10-CM

## 2024-07-24 DIAGNOSIS — Z12.31 ENCOUNTER FOR SCREENING MAMMOGRAM FOR MALIGNANT NEOPLASM OF BREAST: ICD-10-CM

## 2024-07-24 DIAGNOSIS — M79.604 PAIN OF RIGHT LOWER EXTREMITY: ICD-10-CM

## 2024-07-24 PROCEDURE — 99214 OFFICE O/P EST MOD 30 MIN: CPT | Performed by: FAMILY MEDICINE

## 2024-07-24 RX ORDER — GABAPENTIN 300 MG/1
CAPSULE ORAL
Qty: 90 CAPSULE | Refills: 11 | Status: SHIPPED | OUTPATIENT
Start: 2024-07-24 | End: 2025-07-24

## 2024-07-24 RX ORDER — FLUOXETINE 10 MG/1
10 TABLET, FILM COATED ORAL DAILY
Qty: 30 TABLET | Refills: 11 | Status: SHIPPED | OUTPATIENT
Start: 2024-07-24

## 2024-07-24 NOTE — PROGRESS NOTES
Hydrocodone-Acetaminophen Itching and Rash       /75   Pulse 81   Temp 98.1 °F (36.7 °C)   Resp 16   Ht 1.803 m (5' 11\")   Wt 115.2 kg (254 lb)   SpO2 98%   BMI 35.43 kg/m²     HEENT: Normocephalic, atraumatic, pupils equal and reactive to light. Tympanic membranes intact without erythema or edema. Oropharynx clear.   Neck: Supple, no masses or thyromegaly.  Lungs: clear to auscultation bilaterally.  CV: regular rate and rhythm, without murmurs, rubs, or gallops.  Abdomen: soft, nontender, nondistended, no masses. No CVAT tenderness.  Ext: No lower extremity edema,    Diabetic foot exam:   Left Foot:   Visual Exam: normal   Pulse DP: 2+ (normal)   Filament test: normal sensation     Right Foot:   Visual Exam: normal   Pulse DP: 2+ (normal)   Filament test: normal sensation       No results found for this visit on 07/24/24.    Ene was seen today for diabetes.    Diagnoses and all orders for this visit:    Moderate single current episode of major depressive disorder (HCC)  -     FLUoxetine (PROZAC) 10 MG tablet; Take 1 tablet by mouth daily    Right hip pain  -     gabapentin (NEURONTIN) 300 MG capsule; 3 at bedtime    Right arm pain  -     gabapentin (NEURONTIN) 300 MG capsule; 3 at bedtime    Pain of right lower extremity  -     gabapentin (NEURONTIN) 300 MG capsule; 3 at bedtime    Chronic pain of right knee  -     gabapentin (NEURONTIN) 300 MG capsule; 3 at bedtime    Encounter for screening mammogram for malignant neoplasm of breast  -     [facility] YAZMIN DIGITAL SCREEN BILATERAL; Future    Controlled type 2 diabetes mellitus without complication, without long-term current use of insulin (HCC)  -     Comprehensive Metabolic Panel; Future  -     Microalbumin / Creatinine Urine Ratio; Future  -     Lipid Panel; Future  -     Hemoglobin A1C; Future    Severe obesity (BMI 35.0-39.9) with comorbidity (HCC)    She will return soon for fasting labs.  Continue above medicines.  She    Patient counseled on diet

## 2024-11-17 SDOH — ECONOMIC STABILITY: INCOME INSECURITY: HOW HARD IS IT FOR YOU TO PAY FOR THE VERY BASICS LIKE FOOD, HOUSING, MEDICAL CARE, AND HEATING?: SOMEWHAT HARD

## 2024-11-17 SDOH — ECONOMIC STABILITY: FOOD INSECURITY: WITHIN THE PAST 12 MONTHS, YOU WORRIED THAT YOUR FOOD WOULD RUN OUT BEFORE YOU GOT MONEY TO BUY MORE.: PATIENT DECLINED

## 2024-11-17 SDOH — ECONOMIC STABILITY: TRANSPORTATION INSECURITY
IN THE PAST 12 MONTHS, HAS LACK OF TRANSPORTATION KEPT YOU FROM MEETINGS, WORK, OR FROM GETTING THINGS NEEDED FOR DAILY LIVING?: NO

## 2024-11-17 SDOH — ECONOMIC STABILITY: FOOD INSECURITY: WITHIN THE PAST 12 MONTHS, THE FOOD YOU BOUGHT JUST DIDN'T LAST AND YOU DIDN'T HAVE MONEY TO GET MORE.: PATIENT DECLINED

## 2024-11-18 ENCOUNTER — OFFICE VISIT (OUTPATIENT)
Dept: FAMILY MEDICINE CLINIC | Facility: CLINIC | Age: 59
End: 2024-11-18
Payer: COMMERCIAL

## 2024-11-18 VITALS
SYSTOLIC BLOOD PRESSURE: 136 MMHG | HEIGHT: 71 IN | HEART RATE: 75 BPM | WEIGHT: 248 LBS | DIASTOLIC BLOOD PRESSURE: 81 MMHG | OXYGEN SATURATION: 98 % | BODY MASS INDEX: 34.72 KG/M2 | RESPIRATION RATE: 16 BRPM

## 2024-11-18 DIAGNOSIS — G89.29 CHRONIC PAIN OF RIGHT KNEE: ICD-10-CM

## 2024-11-18 DIAGNOSIS — M25.561 CHRONIC PAIN OF RIGHT KNEE: ICD-10-CM

## 2024-11-18 DIAGNOSIS — M48.02 SPINAL STENOSIS OF CERVICAL REGION: ICD-10-CM

## 2024-11-18 DIAGNOSIS — M25.551 RIGHT HIP PAIN: Primary | ICD-10-CM

## 2024-11-18 DIAGNOSIS — M79.644 FINGER PAIN, RIGHT: ICD-10-CM

## 2024-11-18 DIAGNOSIS — M48.061 SPINAL STENOSIS OF LUMBAR REGION WITHOUT NEUROGENIC CLAUDICATION: ICD-10-CM

## 2024-11-18 PROCEDURE — 99213 OFFICE O/P EST LOW 20 MIN: CPT | Performed by: FAMILY MEDICINE

## 2024-11-18 NOTE — PROGRESS NOTES
[facility] [place] [address]  Phone: [phone] Fax [phone]  Barb Ramos MD  11/18/2024           Ms. Angel  is a 59 y.o.  year old  female patient who comes in complaining of any problems with her low back, right hip, right knee, and hand pain.  She is actually been on disability for a long time because of arthritis in her spine including her neck and lumbar region.  It has affected her ability to walk.  She has had 2 surgeries on her neck and the first time they took bone grafts out of her right hip that caused chronic pain in her right hip.  She does take 3 gabapentin of the 300 mg tablets at bedtime to help with the pain.  She does walk with a cane and has a great deal of difficulty getting up and down.  She has to sleep in a recliner because the pain is so bad.  She does need long-term disability paperwork filled out which we have done for her in the past.    Ms. Angel  has  has a past medical history of Arthritis, Cervical spondylosis with radiculopathy, Chondromalacia of left patellofemoral joint, Chondromalacia of patella, Chronic pain, MMT (medial meniscus tear), Obesity (BMI 30-39.9), Plica syndrome, Shoulder pain, right, Tear of lateral cartilage or meniscus of knee, current, Tear of lateral meniscus of left knee, current, Tear of medial meniscus of left knee, current, Varicose veins, and Venous insufficiency (chronic) (peripheral).    Ms. Angel  has  has a past surgical history that includes implant breast silicone/eq (Bilateral, 1995); Cervical discectomy (1999); orthopedic surgery (Right); Knee arthroscopy (Right, 2015); Knee arthroscopy (Left, 2016); Tonsillectomy (as child); Hysterectomy (1998); Breast surgery; heent; and Hand surgery (Right, 11/27/2023).    Ms. Angel   Current Outpatient Medications   Medication Sig Dispense Refill    FLUoxetine (PROZAC) 10 MG tablet Take 1 tablet by mouth daily 30 tablet 11    gabapentin

## 2024-11-21 ENCOUNTER — TELEPHONE (OUTPATIENT)
Dept: FAMILY MEDICINE CLINIC | Facility: CLINIC | Age: 59
End: 2024-11-21

## 2024-11-21 NOTE — TELEPHONE ENCOUNTER
----- Message from Dr. Barb Ramos MD sent at 11/20/2024  8:52 AM EST -----  Let patient know x-ray shows mild arthritis in her right hip.

## 2024-11-24 NOTE — TELEPHONE ENCOUNTER
Would like for her to try ibuprofen 200 mg twice daily. If not better in several weeks let us know and we can refer to orthopedics.

## 2024-12-02 ENCOUNTER — TELEPHONE (OUTPATIENT)
Dept: FAMILY MEDICINE CLINIC | Facility: CLINIC | Age: 59
End: 2024-12-02

## 2024-12-03 NOTE — TELEPHONE ENCOUNTER
Spoke to pt. The [place] is missing a page. Pt is bringing the page that is missing and I will refax

## 2025-01-08 ENCOUNTER — TELEPHONE (OUTPATIENT)
Dept: FAMILY MEDICINE CLINIC | Facility: CLINIC | Age: 60
End: 2025-01-08

## 2025-01-08 DIAGNOSIS — F32.1 MODERATE SINGLE CURRENT EPISODE OF MAJOR DEPRESSIVE DISORDER (HCC): ICD-10-CM

## 2025-01-08 RX ORDER — FLUOXETINE 10 MG/1
10 CAPSULE ORAL DAILY
Qty: 90 CAPSULE | Refills: 3 | Status: SHIPPED | OUTPATIENT
Start: 2025-01-08

## 2025-01-08 NOTE — TELEPHONE ENCOUNTER
Needs her fluoxetine needs to be changed tp 10 mg caps not tabs because of new ins [facility] [employer] [place]

## 2025-01-21 DIAGNOSIS — E11.9 CONTROLLED TYPE 2 DIABETES MELLITUS WITHOUT COMPLICATION, WITHOUT LONG-TERM CURRENT USE OF INSULIN (HCC): ICD-10-CM

## 2025-01-21 LAB
ALBUMIN SERPL-MCNC: 3.9 G/DL (ref 3.5–5)
ALBUMIN/GLOB SERPL: 1 (ref 1–1.9)
ALP SERPL-CCNC: 100 U/L (ref 35–104)
ALT SERPL-CCNC: 44 U/L (ref 8–45)
ANION GAP SERPL CALC-SCNC: 13 MMOL/L (ref 7–16)
AST SERPL-CCNC: 41 U/L (ref 15–37)
BILIRUB SERPL-MCNC: 0.4 MG/DL (ref 0–1.2)
BUN SERPL-MCNC: 13 MG/DL (ref 6–23)
CALCIUM SERPL-MCNC: 9.5 MG/DL (ref 8.8–10.2)
CHLORIDE SERPL-SCNC: 99 MMOL/L (ref 98–107)
CHOLEST SERPL-MCNC: 244 MG/DL (ref 0–200)
CO2 SERPL-SCNC: 25 MMOL/L (ref 20–29)
CREAT SERPL-MCNC: 0.78 MG/DL (ref 0.6–1.1)
CREAT UR-MCNC: 163 MG/DL (ref 28–217)
EST. AVERAGE GLUCOSE BLD GHB EST-MCNC: 196 MG/DL
GLOBULIN SER CALC-MCNC: 4 G/DL (ref 2.3–3.5)
GLUCOSE SERPL-MCNC: 186 MG/DL (ref 70–99)
HBA1C MFR BLD: 8.4 % (ref 0–5.6)
HDLC SERPL-MCNC: 43 MG/DL (ref 40–60)
HDLC SERPL: 5.6 (ref 0–5)
LDLC SERPL CALC-MCNC: 170 MG/DL (ref 0–100)
MICROALBUMIN UR-MCNC: 1.81 MG/DL (ref 0–20)
MICROALBUMIN/CREAT UR-RTO: 11 MG/G (ref 0–30)
POTASSIUM SERPL-SCNC: 4.4 MMOL/L (ref 3.5–5.1)
PROT SERPL-MCNC: 7.9 G/DL (ref 6.3–8.2)
SODIUM SERPL-SCNC: 137 MMOL/L (ref 136–145)
TRIGL SERPL-MCNC: 152 MG/DL (ref 0–150)
VLDLC SERPL CALC-MCNC: 30 MG/DL (ref 6–23)

## 2025-01-27 ENCOUNTER — OFFICE VISIT (OUTPATIENT)
Dept: FAMILY MEDICINE CLINIC | Facility: CLINIC | Age: 60
End: 2025-01-27
Payer: MEDICARE

## 2025-01-27 VITALS
RESPIRATION RATE: 17 BRPM | HEIGHT: 71 IN | WEIGHT: 250.4 LBS | TEMPERATURE: 97.7 F | BODY MASS INDEX: 35.06 KG/M2 | OXYGEN SATURATION: 98 % | HEART RATE: 76 BPM | SYSTOLIC BLOOD PRESSURE: 135 MMHG | DIASTOLIC BLOOD PRESSURE: 85 MMHG

## 2025-01-27 DIAGNOSIS — F32.1 MODERATE SINGLE CURRENT EPISODE OF MAJOR DEPRESSIVE DISORDER (HCC): ICD-10-CM

## 2025-01-27 DIAGNOSIS — B37.9 CANDIDA ALBICANS INFECTION: ICD-10-CM

## 2025-01-27 DIAGNOSIS — E66.01 OBESITY, MORBID: ICD-10-CM

## 2025-01-27 DIAGNOSIS — M22.42 CHONDROMALACIA OF LEFT PATELLOFEMORAL JOINT: ICD-10-CM

## 2025-01-27 DIAGNOSIS — E11.65 TYPE 2 DIABETES MELLITUS WITH HYPERGLYCEMIA, WITHOUT LONG-TERM CURRENT USE OF INSULIN (HCC): Primary | ICD-10-CM

## 2025-01-27 PROCEDURE — G8427 DOCREV CUR MEDS BY ELIG CLIN: HCPCS | Performed by: FAMILY MEDICINE

## 2025-01-27 PROCEDURE — 99214 OFFICE O/P EST MOD 30 MIN: CPT | Performed by: FAMILY MEDICINE

## 2025-01-27 PROCEDURE — 3017F COLORECTAL CA SCREEN DOC REV: CPT | Performed by: FAMILY MEDICINE

## 2025-01-27 PROCEDURE — 3052F HG A1C>EQUAL 8.0%<EQUAL 9.0%: CPT | Performed by: FAMILY MEDICINE

## 2025-01-27 PROCEDURE — G8417 CALC BMI ABV UP PARAM F/U: HCPCS | Performed by: FAMILY MEDICINE

## 2025-01-27 PROCEDURE — 2022F DILAT RTA XM EVC RTNOPTHY: CPT | Performed by: FAMILY MEDICINE

## 2025-01-27 PROCEDURE — 1036F TOBACCO NON-USER: CPT | Performed by: FAMILY MEDICINE

## 2025-01-27 RX ORDER — NYSTATIN 100000 U/G
CREAM TOPICAL
Qty: 100 G | Refills: 5 | Status: SHIPPED | OUTPATIENT
Start: 2025-01-27

## 2025-01-27 SDOH — ECONOMIC STABILITY: FOOD INSECURITY: WITHIN THE PAST 12 MONTHS, THE FOOD YOU BOUGHT JUST DIDN'T LAST AND YOU DIDN'T HAVE MONEY TO GET MORE.: NEVER TRUE

## 2025-01-27 SDOH — ECONOMIC STABILITY: FOOD INSECURITY: WITHIN THE PAST 12 MONTHS, YOU WORRIED THAT YOUR FOOD WOULD RUN OUT BEFORE YOU GOT MONEY TO BUY MORE.: NEVER TRUE

## 2025-01-27 ASSESSMENT — PATIENT HEALTH QUESTIONNAIRE - PHQ9
8. MOVING OR SPEAKING SO SLOWLY THAT OTHER PEOPLE COULD HAVE NOTICED. OR THE OPPOSITE, BEING SO FIGETY OR RESTLESS THAT YOU HAVE BEEN MOVING AROUND A LOT MORE THAN USUAL: NOT AT ALL
7. TROUBLE CONCENTRATING ON THINGS, SUCH AS READING THE NEWSPAPER OR WATCHING TELEVISION: NOT AT ALL
SUM OF ALL RESPONSES TO PHQ QUESTIONS 1-9: 0
SUM OF ALL RESPONSES TO PHQ QUESTIONS 1-9: 0
1. LITTLE INTEREST OR PLEASURE IN DOING THINGS: NOT AT ALL
9. THOUGHTS THAT YOU WOULD BE BETTER OFF DEAD, OR OF HURTING YOURSELF: NOT AT ALL
10. IF YOU CHECKED OFF ANY PROBLEMS, HOW DIFFICULT HAVE THESE PROBLEMS MADE IT FOR YOU TO DO YOUR WORK, TAKE CARE OF THINGS AT HOME, OR GET ALONG WITH OTHER PEOPLE: NOT DIFFICULT AT ALL
2. FEELING DOWN, DEPRESSED OR HOPELESS: NOT AT ALL
3. TROUBLE FALLING OR STAYING ASLEEP: NOT AT ALL
6. FEELING BAD ABOUT YOURSELF - OR THAT YOU ARE A FAILURE OR HAVE LET YOURSELF OR YOUR FAMILY DOWN: NOT AT ALL
SUM OF ALL RESPONSES TO PHQ QUESTIONS 1-9: 0
SUM OF ALL RESPONSES TO PHQ9 QUESTIONS 1 & 2: 0
4. FEELING TIRED OR HAVING LITTLE ENERGY: NOT AT ALL
SUM OF ALL RESPONSES TO PHQ QUESTIONS 1-9: 0
5. POOR APPETITE OR OVEREATING: NOT AT ALL

## 2025-01-27 NOTE — PROGRESS NOTES
[facility] [place] [address]  Phone: [phone] Fax [phone]  Barb Ramos MD  1/27/2025         Ms. Angel  is a 59 y.o.  year old  female patient who comes in to check on diabetes.    History of Present Illness  The patient is a 59-year-old female who presents for a follow-up on her blood glucose levels, elevated cholesterol, pruritus of hands and feet, and skin irritation.    She has been intermittently monitoring her blood glucose levels over the past few weeks, which have consistently shown elevated readings. She has expressed reluctance to resume metformin therapy due to previous adverse gastrointestinal effects. She has recently initiated coverage with [employer] following her disability approval. She is aware of the potential benefits of weight loss in managing her cholesterol levels. She plans to adopt a low-carbohydrate diet, similar to the one she previously followed, which resulted in significant weight loss. Her current beverage intake includes water, occasional sweet tea, and diet Sprite.    She has observed the development of creases in the area of her previous hysterectomy scar, which have become prone to cracking and irritation. She attributes this to the difficulty in finding appropriately fitting undergarments due to her recent weight gain. She has been applying Desitin to maintain dryness, but finds it challenging to remove, leading to recurrent inflammation and redness.    She has reported experiencing intermittent itching in her hands and feet, a symptom she has not previously encountered.    She is doing well with her Prozac. She needs another form for her handicap placard.    FAMILY HISTORY  Her mother had Alzheimer's and passed away in August.    MEDICATIONS  Current: Prozac  Discontinued: Metformin      Ms. Angel  has  has a past medical history of Arthritis, Cervical spondylosis with radiculopathy, Chondromalacia of left

## 2025-02-06 ENCOUNTER — OFFICE VISIT (OUTPATIENT)
Dept: FAMILY MEDICINE CLINIC | Facility: CLINIC | Age: 60
End: 2025-02-06
Payer: MEDICARE

## 2025-02-06 VITALS
SYSTOLIC BLOOD PRESSURE: 143 MMHG | RESPIRATION RATE: 16 BRPM | TEMPERATURE: 98.2 F | HEART RATE: 85 BPM | WEIGHT: 250.2 LBS | HEIGHT: 71 IN | DIASTOLIC BLOOD PRESSURE: 83 MMHG | OXYGEN SATURATION: 98 % | BODY MASS INDEX: 35.03 KG/M2

## 2025-02-06 DIAGNOSIS — R59.0 LYMPHADENOPATHY, SUBMANDIBULAR: ICD-10-CM

## 2025-02-06 DIAGNOSIS — K04.7 DENTAL ABSCESS: Primary | ICD-10-CM

## 2025-02-06 PROCEDURE — 1036F TOBACCO NON-USER: CPT | Performed by: FAMILY MEDICINE

## 2025-02-06 PROCEDURE — 3017F COLORECTAL CA SCREEN DOC REV: CPT | Performed by: FAMILY MEDICINE

## 2025-02-06 PROCEDURE — G8417 CALC BMI ABV UP PARAM F/U: HCPCS | Performed by: FAMILY MEDICINE

## 2025-02-06 PROCEDURE — G8427 DOCREV CUR MEDS BY ELIG CLIN: HCPCS | Performed by: FAMILY MEDICINE

## 2025-02-06 PROCEDURE — 99214 OFFICE O/P EST MOD 30 MIN: CPT | Performed by: FAMILY MEDICINE

## 2025-02-06 ASSESSMENT — ENCOUNTER SYMPTOMS
SORE THROAT: 0
DIARRHEA: 0
SINUS PRESSURE: 0
ABDOMINAL PAIN: 0
RHINORRHEA: 0
CONSTIPATION: 0
SHORTNESS OF BREATH: 0
COUGH: 0
NAUSEA: 0
VOMITING: 0
WHEEZING: 0

## 2025-02-06 NOTE — PROGRESS NOTES
HISTORY OF PRESENT ILLNESS  Chief Complaint   Patient presents with    Facial Pain     And some neck swelling (left side)   Could be tooth infection      NOTICE FOR THE PATIENT: This clinical note is not designed to be interpreted by patients.  We do not recommend reading it unless you have medical training. These notes may contain candid and (unintentionally) offensive descriptions, which are sometimes required for accurate documentation. If you would like more information about your healthcare, please obtain it directly by myself or my staff/colleagues - never solely from the notes. Thank you for your understanding and cooperation.       History of Present Illness  The patient presents for evaluation of dental pain.    She reports an unusual sensation characterized by a pulsating pain on one side of her face. She has been experiencing this discomfort for slightly over a week, with a significant increase in severity since the previous night. She also reports tenderness in her lymph node, which she first noticed two days ago while washing her neck. She has not sought dental consultation due to recent changes in her insurance coverage. She is currently on [employer] through her disability status, but her previous dentist does not accept this insurance. She recalls a dental extraction performed last year due to a broken cavity but does not have any dental implants. She is uncertain about her allergy status to penicillin as it has been a considerable time since her last exposure. She does not require a work note as she is currently on disability. She is not experiencing any respiratory distress or difficulty swallowing, although she admits to some discomfort during these actions. She reports no sinus drainage or sneezing. She has been self-medicating with extra strength Tylenol, initially suspecting a toothache. However, the analgesic effect of Tylenol has diminished over time.    She also mentions a history of

## 2025-03-24 ENCOUNTER — HOSPITAL ENCOUNTER (OUTPATIENT)
Dept: MAMMOGRAPHY | Age: 60
Discharge: HOME | End: 2025-03-27
Attending: FAMILY MEDICINE
Payer: MEDICARE

## 2025-03-24 VITALS — HEIGHT: 71 IN | WEIGHT: 245 LBS | BODY MASS INDEX: 34.3 KG/M2

## 2025-03-24 DIAGNOSIS — Z12.31 ENCOUNTER FOR SCREENING MAMMOGRAM FOR MALIGNANT NEOPLASM OF BREAST: ICD-10-CM

## 2025-03-24 PROCEDURE — 77063 BREAST TOMOSYNTHESIS BI: CPT

## 2025-03-28 ENCOUNTER — RESULTS FOLLOW-UP (OUTPATIENT)
Dept: FAMILY MEDICINE CLINIC | Facility: CLINIC | Age: 60
End: 2025-03-28

## 2025-04-18 ENCOUNTER — TELEPHONE (OUTPATIENT)
Dept: FAMILY MEDICINE CLINIC | Facility: CLINIC | Age: 60
End: 2025-04-18

## 2025-04-18 NOTE — TELEPHONE ENCOUNTER
Adele [employer] patient assistance paper work for semaglutide filled out and placed in MD folder for signature.

## 2025-04-23 ENCOUNTER — TELEPHONE (OUTPATIENT)
Dept: FAMILY MEDICINE CLINIC | Facility: CLINIC | Age: 60
End: 2025-04-23

## 2025-04-23 NOTE — TELEPHONE ENCOUNTER
Fax received from [employer] for page 8 of the Prescriber Section to be completed. Placed in clinical staff tray in front office for Dr Ramos.         Fax also received from [employer] that the patient did not complete page 2 of the patient section.

## 2025-04-29 ENCOUNTER — TELEPHONE (OUTPATIENT)
Dept: FAMILY MEDICINE CLINIC | Facility: CLINIC | Age: 60
End: 2025-04-29

## 2025-04-29 NOTE — TELEPHONE ENCOUNTER
Pt called back requesting to verify if the forms have been received. Verified with Eulalia () who was going through the faxes.      stated we did receive the fax and its been placed in Dr. Sharma box.     Informed pt its been received and we will call her back ones the provider fills out her portion of the paper work.     Pt verbalized understanding.

## 2025-04-29 NOTE — TELEPHONE ENCOUNTER
Pt called stating jane [employer] didn't receive full form. Will be sending a fax with what's missing. It is missing g 2 and 4. Page 8 will also needs correction. Needs to say 4 boxes not 11. They only send out 4 at a time. Please advise. Please call pt about pg 2 and 4 when we receive it. She laci have to sign on those pages.

## 2025-05-06 ENCOUNTER — TELEPHONE (OUTPATIENT)
Dept: FAMILY MEDICINE CLINIC | Facility: CLINIC | Age: 60
End: 2025-05-06

## 2025-05-06 NOTE — TELEPHONE ENCOUNTER
Unable to locate any of the patient assistance paperwork. New alma rosa printed completed , pt will come by to sign the consents and drop off financial information

## 2025-05-08 NOTE — TELEPHONE ENCOUNTER
Pt calling about her paperwork. Asking for a call back from zaheer since she has spoken to her about this.

## 2025-05-28 ENCOUNTER — TELEPHONE (OUTPATIENT)
Dept: FAMILY MEDICINE CLINIC | Facility: CLINIC | Age: 60
End: 2025-05-28

## 2025-06-05 ENCOUNTER — PATIENT MESSAGE (OUTPATIENT)
Dept: FAMILY MEDICINE CLINIC | Facility: CLINIC | Age: 60
End: 2025-06-05

## 2025-06-05 DIAGNOSIS — E11.65 TYPE 2 DIABETES MELLITUS WITH HYPERGLYCEMIA, WITHOUT LONG-TERM CURRENT USE OF INSULIN (HCC): Primary | ICD-10-CM

## 2025-06-08 RX ORDER — BLOOD-GLUCOSE METER
KIT MISCELLANEOUS
Qty: 1 KIT | Refills: 0 | Status: SHIPPED | OUTPATIENT
Start: 2025-06-08

## 2025-06-08 RX ORDER — LANCING DEVICE/LANCETS
KIT MISCELLANEOUS
Qty: 1 KIT | Refills: 2 | Status: SHIPPED | OUTPATIENT
Start: 2025-06-08

## 2025-06-08 RX ORDER — GLUCOSAMINE HCL/CHONDROITIN SU 500-400 MG
CAPSULE ORAL
Qty: 100 STRIP | Refills: 3 | Status: SHIPPED | OUTPATIENT
Start: 2025-06-08

## 2025-06-08 RX ORDER — LANCETS
EACH MISCELLANEOUS
Qty: 100 EACH | Refills: 3 | Status: SHIPPED | OUTPATIENT
Start: 2025-06-08

## 2025-06-25 ENCOUNTER — OFFICE VISIT (OUTPATIENT)
Dept: FAMILY MEDICINE CLINIC | Facility: CLINIC | Age: 60
End: 2025-06-25
Payer: MEDICARE

## 2025-06-25 VITALS
BODY MASS INDEX: 34.58 KG/M2 | HEART RATE: 76 BPM | SYSTOLIC BLOOD PRESSURE: 124 MMHG | HEIGHT: 71 IN | TEMPERATURE: 97.1 F | OXYGEN SATURATION: 96 % | DIASTOLIC BLOOD PRESSURE: 76 MMHG | WEIGHT: 247 LBS | RESPIRATION RATE: 16 BRPM

## 2025-06-25 DIAGNOSIS — M79.604 PAIN OF RIGHT LOWER EXTREMITY: ICD-10-CM

## 2025-06-25 DIAGNOSIS — M25.551 RIGHT HIP PAIN: ICD-10-CM

## 2025-06-25 DIAGNOSIS — M25.561 CHRONIC PAIN OF RIGHT KNEE: ICD-10-CM

## 2025-06-25 DIAGNOSIS — E11.9 CONTROLLED TYPE 2 DIABETES MELLITUS WITHOUT COMPLICATION, WITHOUT LONG-TERM CURRENT USE OF INSULIN (HCC): ICD-10-CM

## 2025-06-25 DIAGNOSIS — E11.65 TYPE 2 DIABETES MELLITUS WITH HYPERGLYCEMIA, WITHOUT LONG-TERM CURRENT USE OF INSULIN (HCC): Primary | ICD-10-CM

## 2025-06-25 DIAGNOSIS — M79.601 RIGHT ARM PAIN: ICD-10-CM

## 2025-06-25 DIAGNOSIS — G89.29 CHRONIC PAIN OF RIGHT KNEE: ICD-10-CM

## 2025-06-25 LAB
ALBUMIN SERPL-MCNC: 3.5 G/DL (ref 3.2–4.6)
ALBUMIN/GLOB SERPL: 0.9 (ref 1–1.9)
ALP SERPL-CCNC: 98 U/L (ref 35–104)
ALT SERPL-CCNC: 42 U/L (ref 8–45)
ANION GAP SERPL CALC-SCNC: 11 MMOL/L (ref 7–16)
AST SERPL-CCNC: 45 U/L (ref 15–37)
BILIRUB SERPL-MCNC: 0.2 MG/DL (ref 0–1.2)
BUN SERPL-MCNC: 12 MG/DL (ref 8–23)
CALCIUM SERPL-MCNC: 9.7 MG/DL (ref 8.8–10.2)
CHLORIDE SERPL-SCNC: 101 MMOL/L (ref 98–107)
CHOLEST SERPL-MCNC: 227 MG/DL (ref 0–200)
CO2 SERPL-SCNC: 26 MMOL/L (ref 20–29)
CREAT SERPL-MCNC: 0.83 MG/DL (ref 0.6–1.1)
EST. AVERAGE GLUCOSE BLD GHB EST-MCNC: 159 MG/DL
GLOBULIN SER CALC-MCNC: 3.9 G/DL (ref 2.3–3.5)
GLUCOSE SERPL-MCNC: 112 MG/DL (ref 70–99)
HBA1C MFR BLD: 7.2 % (ref 0–5.6)
HDLC SERPL-MCNC: 41 MG/DL (ref 40–60)
HDLC SERPL: 5.6 (ref 0–5)
LDLC SERPL CALC-MCNC: 147 MG/DL (ref 0–100)
POTASSIUM SERPL-SCNC: 4.3 MMOL/L (ref 3.5–5.1)
PROT SERPL-MCNC: 7.5 G/DL (ref 6.3–8.2)
SODIUM SERPL-SCNC: 139 MMOL/L (ref 136–145)
TRIGL SERPL-MCNC: 198 MG/DL (ref 0–150)
VLDLC SERPL CALC-MCNC: 40 MG/DL (ref 6–23)

## 2025-06-25 PROCEDURE — G8427 DOCREV CUR MEDS BY ELIG CLIN: HCPCS | Performed by: FAMILY MEDICINE

## 2025-06-25 PROCEDURE — 2022F DILAT RTA XM EVC RTNOPTHY: CPT | Performed by: FAMILY MEDICINE

## 2025-06-25 PROCEDURE — 3052F HG A1C>EQUAL 8.0%<EQUAL 9.0%: CPT | Performed by: FAMILY MEDICINE

## 2025-06-25 PROCEDURE — 1036F TOBACCO NON-USER: CPT | Performed by: FAMILY MEDICINE

## 2025-06-25 PROCEDURE — 3017F COLORECTAL CA SCREEN DOC REV: CPT | Performed by: FAMILY MEDICINE

## 2025-06-25 PROCEDURE — 99214 OFFICE O/P EST MOD 30 MIN: CPT | Performed by: FAMILY MEDICINE

## 2025-06-25 PROCEDURE — G2211 COMPLEX E/M VISIT ADD ON: HCPCS | Performed by: FAMILY MEDICINE

## 2025-06-25 PROCEDURE — G8417 CALC BMI ABV UP PARAM F/U: HCPCS | Performed by: FAMILY MEDICINE

## 2025-06-25 RX ORDER — GABAPENTIN 300 MG/1
CAPSULE ORAL
Qty: 90 CAPSULE | Refills: 11 | Status: SHIPPED | OUTPATIENT
Start: 2025-06-25 | End: 2026-06-25

## 2025-06-25 NOTE — PROGRESS NOTES
[facility] [place] [address]  Phone: [phone] Fax [phone]  Barb Ramos MD  6/25/2025               History of Present Illness  The patient presents for evaluation of diabetes.    She has been on Ozempic for the past month, with her most recent injection administered this Monday. She reports experiencing significant dizziness, nausea, and severe indigestion as side effects of the medication. Despite these side effects, she expresses a willingness to continue the treatment, citing anecdotal evidence from others that these symptoms may subside after 2 to 3 months. She also notes that Ozempic appears to suppress her appetite, requiring her to consciously remind herself to eat in the evening. She has experienced some weight loss since starting the medication.    She has not previously tried Mounjaro.    Morning nausea is managed by consuming crackers and ice water before taking her fluoxetine medication to prevent vomiting.     She is uncertain about the need for refills of her gabapentin prescription which she takes for back and knee pain.            Ms. Angel  has  has a past medical history of Arthritis, Cervical spondylosis with radiculopathy, Chondromalacia of left patellofemoral joint, Chondromalacia of patella, Chronic pain, MMT (medial meniscus tear), Obesity (BMI 30-39.9), Plica syndrome, Shoulder pain, right, Tear of lateral cartilage or meniscus of knee, current, Tear of lateral meniscus of left knee, current, Tear of medial meniscus of left knee, current, Varicose veins, and Venous insufficiency (chronic) (peripheral).    Ms. Angel  has  has a past surgical history that includes implant breast silicone/eq (Bilateral, 1995); Cervical discectomy (1999); orthopedic surgery (Right); Knee arthroscopy (Right, 2015); Knee arthroscopy (Left, 2016); Tonsillectomy (as child); Hysterectomy (1998); Breast surgery; heent; and Hand surgery (Right,

## 2025-06-27 ENCOUNTER — RESULTS FOLLOW-UP (OUTPATIENT)
Dept: FAMILY MEDICINE CLINIC | Facility: CLINIC | Age: 60
End: 2025-06-27

## 2025-06-27 ENCOUNTER — TELEPHONE (OUTPATIENT)
Dept: FAMILY MEDICINE CLINIC | Facility: CLINIC | Age: 60
End: 2025-06-27

## 2025-07-28 ENCOUNTER — TELEPHONE (OUTPATIENT)
Dept: FAMILY MEDICINE CLINIC | Facility: CLINIC | Age: 60
End: 2025-07-28

## 2025-07-28 NOTE — TELEPHONE ENCOUNTER
----- Message from Tracie CADENA sent at 7/28/2025  1:01 PM EDT -----  Regarding: [facility] Referral Request  [facility] Referral Request    Reason for referral request: Specialty Provider : Podiatry  Dr. Loy Hernandez  in [facility] location  : [address] [place] [address]    Specialist/Lab/Test patient is requesting (if known): n/a    Specialist Phone Number (if applicable): Faxed number : [phone]    Additional Information : Patient is requesting a referral for podiatry specialist, she have a 2 ingrown that are giving her problems and wanted to address this issue..   --------------------------------------------------------------------------------------------------------------------------    Relationship to Patient: Self     Call Back Information: OK to leave message on voicemail  Preferred Call Back Number: Phone number : [phone] (home)

## 2025-07-30 ENCOUNTER — OFFICE VISIT (OUTPATIENT)
Dept: FAMILY MEDICINE CLINIC | Facility: CLINIC | Age: 60
End: 2025-07-30
Payer: MEDICARE

## 2025-07-30 VITALS
SYSTOLIC BLOOD PRESSURE: 131 MMHG | HEART RATE: 74 BPM | WEIGHT: 243.3 LBS | DIASTOLIC BLOOD PRESSURE: 79 MMHG | RESPIRATION RATE: 16 BRPM | TEMPERATURE: 98.4 F | BODY MASS INDEX: 34.06 KG/M2 | OXYGEN SATURATION: 97 % | HEIGHT: 71 IN

## 2025-07-30 DIAGNOSIS — L71.9 ROSACEA: ICD-10-CM

## 2025-07-30 DIAGNOSIS — L60.0 INGROWN TOENAIL: Primary | ICD-10-CM

## 2025-07-30 DIAGNOSIS — L21.9 SEBORRHEIC DERMATITIS OF SCALP: ICD-10-CM

## 2025-07-30 PROCEDURE — 1036F TOBACCO NON-USER: CPT | Performed by: PHYSICIAN ASSISTANT

## 2025-07-30 PROCEDURE — 99213 OFFICE O/P EST LOW 20 MIN: CPT | Performed by: PHYSICIAN ASSISTANT

## 2025-07-30 PROCEDURE — G8427 DOCREV CUR MEDS BY ELIG CLIN: HCPCS | Performed by: PHYSICIAN ASSISTANT

## 2025-07-30 PROCEDURE — 3017F COLORECTAL CA SCREEN DOC REV: CPT | Performed by: PHYSICIAN ASSISTANT

## 2025-07-30 PROCEDURE — G8417 CALC BMI ABV UP PARAM F/U: HCPCS | Performed by: PHYSICIAN ASSISTANT

## 2025-07-30 RX ORDER — KETOCONAZOLE 20 MG/ML
SHAMPOO, SUSPENSION TOPICAL
Qty: 120 ML | Refills: 1 | Status: SHIPPED | OUTPATIENT
Start: 2025-07-30

## 2025-07-30 RX ORDER — FLUTICASONE PROPIONATE 0.05 %
CREAM (GRAM) TOPICAL
Qty: 30 G | Refills: 1 | Status: SHIPPED | OUTPATIENT
Start: 2025-07-30 | End: 2025-08-29

## 2025-07-30 ASSESSMENT — PATIENT HEALTH QUESTIONNAIRE - PHQ9
9. THOUGHTS THAT YOU WOULD BE BETTER OFF DEAD, OR OF HURTING YOURSELF: NOT AT ALL
3. TROUBLE FALLING OR STAYING ASLEEP: NOT AT ALL
SUM OF ALL RESPONSES TO PHQ QUESTIONS 1-9: 0
SUM OF ALL RESPONSES TO PHQ QUESTIONS 1-9: 0
2. FEELING DOWN, DEPRESSED OR HOPELESS: NOT AT ALL
1. LITTLE INTEREST OR PLEASURE IN DOING THINGS: NOT AT ALL
SUM OF ALL RESPONSES TO PHQ QUESTIONS 1-9: 0
8. MOVING OR SPEAKING SO SLOWLY THAT OTHER PEOPLE COULD HAVE NOTICED. OR THE OPPOSITE, BEING SO FIGETY OR RESTLESS THAT YOU HAVE BEEN MOVING AROUND A LOT MORE THAN USUAL: NOT AT ALL
SUM OF ALL RESPONSES TO PHQ QUESTIONS 1-9: 0
10. IF YOU CHECKED OFF ANY PROBLEMS, HOW DIFFICULT HAVE THESE PROBLEMS MADE IT FOR YOU TO DO YOUR WORK, TAKE CARE OF THINGS AT HOME, OR GET ALONG WITH OTHER PEOPLE: NOT DIFFICULT AT ALL
6. FEELING BAD ABOUT YOURSELF - OR THAT YOU ARE A FAILURE OR HAVE LET YOURSELF OR YOUR FAMILY DOWN: NOT AT ALL
7. TROUBLE CONCENTRATING ON THINGS, SUCH AS READING THE NEWSPAPER OR WATCHING TELEVISION: NOT AT ALL
4. FEELING TIRED OR HAVING LITTLE ENERGY: NOT AT ALL
5. POOR APPETITE OR OVEREATING: NOT AT ALL

## 2025-07-30 ASSESSMENT — ENCOUNTER SYMPTOMS
SHORTNESS OF BREATH: 0
COLOR CHANGE: 1
ROS SKIN COMMENTS: TOENAIL CHANGES

## 2025-07-30 NOTE — PROGRESS NOTES
[facility] [address] [address]  Phone [phone]      Patient: Ene Angel  YOB: 1965  Age 60 y.o.  Sex female  Medical Record:  [id number]  Visit Date: 07/30/25  Author:  Zoltan Ulrich PA-C    [facility] Note    Chief Complaint   Patient presents with    Referral - General     Wants a referral to podiatry    Other     Wants a new rx for Fluticasone Propionate Cream 0.05%       History of Present Illness  History of Present Illness  The patient is a 60-year-old female who presents for evaluation of onychomycosis, rosacea, and rash on scalp.    She has been grappling with issues related to her toes for several years. Her insurance company previously declined coverage for the costly medication prescribed for nail fungus. Consequently, she resorted to home remedies such as soaking her feet in vinegar and water. Over time, her toenails have thickened and become ingrown, causing discomfort. She experiences difficulty bending over to care for them due to previous surgeries. The problem is more pronounced in her right big toe. She has not previously consulted with Dr. Loy Hernandez, a podiatrist, but has confirmed that he is accepting new patients. However, she was informed that a referral is necessary.    Approximately 2.5 to 3 years ago, a dermatologist diagnosed her with rosacea and prescribed a medium potency steroid cream. This treatment has been effective in managing the condition, preventing itching and dryness. Initially, she applied a thin layer of the cream daily, but now only uses it during flare-ups. She is seeking a refill as her current supply is nearly depleted.    She has noticed a dry, itchy spot on the back of her head/scalp. Despite maintaining her usual hair washing routine, the area remains dry and itchy. She can feel a ridge when she rubs her finger over the spot.        Past History:    Past Medical history   Past Medical

## 2025-07-30 NOTE — PATIENT INSTRUCTIONS
*A referral has been placed to Podiatry. They should contact you in the next week to schedule. Please let us know if you do not hear from them.   *Use the ketoconazole shampoo 2-3 days weekly, as needed.

## (undated) DEVICE — GLOVE ORANGE PI 7 1/2   MSG9075

## (undated) DEVICE — Device

## (undated) DEVICE — SOLUTION IRRIG 1000ML 0.9% SOD CHL USP POUR PLAS BTL

## (undated) DEVICE — DRAPE, FILM SHEET, 44X65 STERILE: Brand: MEDLINE

## (undated) DEVICE — BANDAGE GZ W2XL75IN ST RAYON POLY CNFRM STRTCH LTWT

## (undated) DEVICE — PADDING CAST W3INXL4YD COT BLEND MIC PLEAT UNDERCAST SPEC

## (undated) DEVICE — SUTURE VCRL RAPIDE SZ 4-0 L18IN ABSRB UD L19MM PS-2 1/2 CIR VR496

## (undated) DEVICE — SUTURE PROL SZ 6-0 L18IN NONABSORBABLE BLU L36MM P-1 3/8 8697G

## (undated) DEVICE — BANDAGE,ELASTIC,ESMARK,STERILE,4"X9',LF: Brand: MEDLINE

## (undated) DEVICE — HAND PACK: Brand: MEDLINE INDUSTRIES, INC.

## (undated) DEVICE — DISPOSABLE BIPOLAR CODE, 12' (3.66 M): Brand: CONMED

## (undated) DEVICE — DRESSING,GAUZE,XEROFORM,CURAD,1"X8",ST: Brand: CURAD

## (undated) DEVICE — BLADE OPHTH GRN ROUNDED TIP 1 SIDE SHRP GRINDLESS MINI-BLDE

## (undated) DEVICE — BANDAGE COMPR L5YDXW2IN FOAM CO FLX